# Patient Record
Sex: FEMALE | Race: ASIAN | NOT HISPANIC OR LATINO | URBAN - METROPOLITAN AREA
[De-identification: names, ages, dates, MRNs, and addresses within clinical notes are randomized per-mention and may not be internally consistent; named-entity substitution may affect disease eponyms.]

---

## 2017-04-01 ENCOUNTER — INPATIENT (INPATIENT)
Facility: HOSPITAL | Age: 32
LOS: 1 days | Discharge: HOME HEALTH | End: 2017-04-03
Attending: OBSTETRICS & GYNECOLOGY | Admitting: OBSTETRICS & GYNECOLOGY
Payer: MEDICAID

## 2017-04-01 VITALS — WEIGHT: 158.73 LBS | HEIGHT: 60 IN

## 2017-04-01 DIAGNOSIS — O26.899 OTHER SPECIFIED PREGNANCY RELATED CONDITIONS, UNSPECIFIED TRIMESTER: ICD-10-CM

## 2017-04-01 LAB
ALBUMIN SERPL ELPH-MCNC: 3.6 G/DL — SIGNIFICANT CHANGE UP (ref 3.3–5)
ALP SERPL-CCNC: 93 U/L — SIGNIFICANT CHANGE UP (ref 40–120)
ALT FLD-CCNC: 20 U/L — SIGNIFICANT CHANGE UP (ref 4–33)
APPEARANCE UR: CLEAR — SIGNIFICANT CHANGE UP
APTT BLD: 28.4 SEC — SIGNIFICANT CHANGE UP (ref 27.5–37.4)
AST SERPL-CCNC: 19 U/L — SIGNIFICANT CHANGE UP (ref 4–32)
BACTERIA # UR AUTO: HIGH
BASOPHILS # BLD AUTO: 0.02 K/UL — SIGNIFICANT CHANGE UP (ref 0–0.2)
BASOPHILS NFR BLD AUTO: 0.1 % — SIGNIFICANT CHANGE UP (ref 0–2)
BILIRUB SERPL-MCNC: 0.3 MG/DL — SIGNIFICANT CHANGE UP (ref 0.2–1.2)
BILIRUB UR-MCNC: NEGATIVE — SIGNIFICANT CHANGE UP
BLD GP AB SCN SERPL QL: NEGATIVE — SIGNIFICANT CHANGE UP
BLOOD UR QL VISUAL: HIGH
BUN SERPL-MCNC: 11 MG/DL — SIGNIFICANT CHANGE UP (ref 7–23)
CALCIUM SERPL-MCNC: 9.7 MG/DL — SIGNIFICANT CHANGE UP (ref 8.4–10.5)
CHLORIDE SERPL-SCNC: 102 MMOL/L — SIGNIFICANT CHANGE UP (ref 98–107)
CO2 SERPL-SCNC: 21 MMOL/L — LOW (ref 22–31)
COLOR SPEC: SIGNIFICANT CHANGE UP
CREAT ?TM UR-MCNC: 31.28 MG/DL — SIGNIFICANT CHANGE UP
CREAT ?TM UR-MCNC: 31.61 MG/DL — SIGNIFICANT CHANGE UP
CREAT SERPL-MCNC: 0.54 MG/DL — SIGNIFICANT CHANGE UP (ref 0.5–1.3)
EOSINOPHIL # BLD AUTO: 0.1 K/UL — SIGNIFICANT CHANGE UP (ref 0–0.5)
EOSINOPHIL NFR BLD AUTO: 0.6 % — SIGNIFICANT CHANGE UP (ref 0–6)
FIBRINOGEN PPP-MCNC: 869 MG/DL — HIGH (ref 310–510)
GLUCOSE SERPL-MCNC: 96 MG/DL — SIGNIFICANT CHANGE UP (ref 70–99)
GLUCOSE UR-MCNC: NEGATIVE — SIGNIFICANT CHANGE UP
HBV SURFACE AG SER-ACNC: NEGATIVE — SIGNIFICANT CHANGE UP
HCT VFR BLD CALC: 37.4 % — SIGNIFICANT CHANGE UP (ref 34.5–45)
HGB BLD-MCNC: 13 G/DL — SIGNIFICANT CHANGE UP (ref 11.5–15.5)
IMM GRANULOCYTES NFR BLD AUTO: 0.9 % — SIGNIFICANT CHANGE UP (ref 0–1.5)
INR BLD: 0.9 — SIGNIFICANT CHANGE UP (ref 0.88–1.17)
KETONES UR-MCNC: NEGATIVE — SIGNIFICANT CHANGE UP
LDH SERPL L TO P-CCNC: 169 U/L — SIGNIFICANT CHANGE UP (ref 135–225)
LEUKOCYTE ESTERASE UR-ACNC: NEGATIVE — SIGNIFICANT CHANGE UP
LYMPHOCYTES # BLD AUTO: 16.2 % — SIGNIFICANT CHANGE UP (ref 13–44)
LYMPHOCYTES # BLD AUTO: 2.64 K/UL — SIGNIFICANT CHANGE UP (ref 1–3.3)
MCHC RBC-ENTMCNC: 29.3 PG — SIGNIFICANT CHANGE UP (ref 27–34)
MCHC RBC-ENTMCNC: 34.8 % — SIGNIFICANT CHANGE UP (ref 32–36)
MCV RBC AUTO: 84.4 FL — SIGNIFICANT CHANGE UP (ref 80–100)
MONOCYTES # BLD AUTO: 0.88 K/UL — SIGNIFICANT CHANGE UP (ref 0–0.9)
MONOCYTES NFR BLD AUTO: 5.4 % — SIGNIFICANT CHANGE UP (ref 2–14)
MUCOUS THREADS # UR AUTO: SIGNIFICANT CHANGE UP
NEUTROPHILS # BLD AUTO: 12.55 K/UL — HIGH (ref 1.8–7.4)
NEUTROPHILS NFR BLD AUTO: 76.8 % — SIGNIFICANT CHANGE UP (ref 43–77)
NITRITE UR-MCNC: NEGATIVE — SIGNIFICANT CHANGE UP
NON-SQ EPI CELLS # UR AUTO: <1 — SIGNIFICANT CHANGE UP
PH UR: 7 — SIGNIFICANT CHANGE UP (ref 4.6–8)
PLATELET # BLD AUTO: 289 K/UL — SIGNIFICANT CHANGE UP (ref 150–400)
PMV BLD: 9.6 FL — SIGNIFICANT CHANGE UP (ref 7–13)
POTASSIUM SERPL-MCNC: 4.1 MMOL/L — SIGNIFICANT CHANGE UP (ref 3.5–5.3)
POTASSIUM SERPL-SCNC: 4.1 MMOL/L — SIGNIFICANT CHANGE UP (ref 3.5–5.3)
PROT SERPL-MCNC: 7.2 G/DL — SIGNIFICANT CHANGE UP (ref 6–8.3)
PROT UR-MCNC: 27.1 MG/DL — SIGNIFICANT CHANGE UP
PROT UR-MCNC: 30 — HIGH
PROT UR-MCNC: 32.7 MG/DL — SIGNIFICANT CHANGE UP
PROTHROM AB SERPL-ACNC: 10.1 SEC — SIGNIFICANT CHANGE UP (ref 9.8–13.1)
RBC # BLD: 4.43 M/UL — SIGNIFICANT CHANGE UP (ref 3.8–5.2)
RBC # FLD: 14.8 % — HIGH (ref 10.3–14.5)
RBC CASTS # UR COMP ASSIST: HIGH (ref 0–?)
RH IG SCN BLD-IMP: POSITIVE — SIGNIFICANT CHANGE UP
RH IG SCN BLD-IMP: POSITIVE — SIGNIFICANT CHANGE UP
SODIUM SERPL-SCNC: 138 MMOL/L — SIGNIFICANT CHANGE UP (ref 135–145)
SP GR SPEC: 1.01 — SIGNIFICANT CHANGE UP (ref 1–1.03)
SQUAMOUS # UR AUTO: SIGNIFICANT CHANGE UP
URATE SERPL-MCNC: 4.4 MG/DL — SIGNIFICANT CHANGE UP (ref 2.5–7)
UROBILINOGEN FLD QL: NORMAL E.U. — SIGNIFICANT CHANGE UP (ref 0.1–0.2)
WBC # BLD: 16.34 K/UL — HIGH (ref 3.8–10.5)
WBC # FLD AUTO: 16.34 K/UL — HIGH (ref 3.8–10.5)
WBC CLUMPS #/AREA URNS HPF: PRESENT — HIGH (ref 0–?)
WBC UR QL: HIGH (ref 0–?)

## 2017-04-01 PROCEDURE — 59409 OBSTETRICAL CARE: CPT | Mod: U8,UB

## 2017-04-01 RX ORDER — SODIUM CHLORIDE 9 MG/ML
1000 INJECTION, SOLUTION INTRAVENOUS
Qty: 0 | Refills: 0 | Status: DISCONTINUED | OUTPATIENT
Start: 2017-04-01 | End: 2017-04-01

## 2017-04-01 RX ORDER — SODIUM CHLORIDE 9 MG/ML
1000 INJECTION, SOLUTION INTRAVENOUS ONCE
Qty: 0 | Refills: 0 | Status: COMPLETED | OUTPATIENT
Start: 2017-04-01 | End: 2017-04-01

## 2017-04-01 RX ORDER — MAGNESIUM HYDROXIDE 400 MG/1
30 TABLET, CHEWABLE ORAL
Qty: 0 | Refills: 0 | Status: DISCONTINUED | OUTPATIENT
Start: 2017-04-01 | End: 2017-04-03

## 2017-04-01 RX ORDER — CITRIC ACID/SODIUM CITRATE 300-500 MG
15 SOLUTION, ORAL ORAL EVERY 4 HOURS
Qty: 0 | Refills: 0 | Status: DISCONTINUED | OUTPATIENT
Start: 2017-04-01 | End: 2017-04-01

## 2017-04-01 RX ORDER — DOCUSATE SODIUM 100 MG
100 CAPSULE ORAL
Qty: 0 | Refills: 0 | Status: DISCONTINUED | OUTPATIENT
Start: 2017-04-01 | End: 2017-04-03

## 2017-04-01 RX ORDER — LANOLIN
1 OINTMENT (GRAM) TOPICAL EVERY 6 HOURS
Qty: 0 | Refills: 0 | Status: DISCONTINUED | OUTPATIENT
Start: 2017-04-01 | End: 2017-04-03

## 2017-04-01 RX ORDER — IBUPROFEN 200 MG
600 TABLET ORAL EVERY 6 HOURS
Qty: 0 | Refills: 0 | Status: DISCONTINUED | OUTPATIENT
Start: 2017-04-01 | End: 2017-04-03

## 2017-04-01 RX ORDER — TETANUS TOXOID, REDUCED DIPHTHERIA TOXOID AND ACELLULAR PERTUSSIS VACCINE, ADSORBED 5; 2.5; 8; 8; 2.5 [IU]/.5ML; [IU]/.5ML; UG/.5ML; UG/.5ML; UG/.5ML
0.5 SUSPENSION INTRAMUSCULAR ONCE
Qty: 0 | Refills: 0 | Status: DISCONTINUED | OUTPATIENT
Start: 2017-04-01 | End: 2017-04-03

## 2017-04-01 RX ORDER — OXYTOCIN 10 UNIT/ML
41.67 VIAL (ML) INJECTION
Qty: 20 | Refills: 0 | Status: DISCONTINUED | OUTPATIENT
Start: 2017-04-01 | End: 2017-04-02

## 2017-04-01 RX ORDER — HYDROCORTISONE 1 %
1 OINTMENT (GRAM) TOPICAL EVERY 4 HOURS
Qty: 0 | Refills: 0 | Status: DISCONTINUED | OUTPATIENT
Start: 2017-04-01 | End: 2017-04-02

## 2017-04-01 RX ORDER — PRAMOXINE HYDROCHLORIDE 150 MG/15G
1 AEROSOL, FOAM RECTAL EVERY 4 HOURS
Qty: 0 | Refills: 0 | Status: DISCONTINUED | OUTPATIENT
Start: 2017-04-01 | End: 2017-04-02

## 2017-04-01 RX ORDER — SODIUM CHLORIDE 9 MG/ML
3 INJECTION INTRAMUSCULAR; INTRAVENOUS; SUBCUTANEOUS EVERY 8 HOURS
Qty: 0 | Refills: 0 | Status: DISCONTINUED | OUTPATIENT
Start: 2017-04-01 | End: 2017-04-01

## 2017-04-01 RX ORDER — MORPHINE SULFATE 50 MG/1
4 CAPSULE, EXTENDED RELEASE ORAL ONCE
Qty: 0 | Refills: 0 | Status: DISCONTINUED | OUTPATIENT
Start: 2017-04-01 | End: 2017-04-01

## 2017-04-01 RX ORDER — SIMETHICONE 80 MG/1
80 TABLET, CHEWABLE ORAL EVERY 6 HOURS
Qty: 0 | Refills: 0 | Status: DISCONTINUED | OUTPATIENT
Start: 2017-04-01 | End: 2017-04-03

## 2017-04-01 RX ORDER — AER TRAVELER 0.5 G/1
1 SOLUTION RECTAL; TOPICAL EVERY 4 HOURS
Qty: 0 | Refills: 0 | Status: DISCONTINUED | OUTPATIENT
Start: 2017-04-01 | End: 2017-04-01

## 2017-04-01 RX ORDER — OXYCODONE HYDROCHLORIDE 5 MG/1
5 TABLET ORAL EVERY 4 HOURS
Qty: 0 | Refills: 0 | Status: DISCONTINUED | OUTPATIENT
Start: 2017-04-01 | End: 2017-04-03

## 2017-04-01 RX ORDER — KETOROLAC TROMETHAMINE 30 MG/ML
30 SYRINGE (ML) INJECTION ONCE
Qty: 0 | Refills: 0 | Status: DISCONTINUED | OUTPATIENT
Start: 2017-04-01 | End: 2017-04-01

## 2017-04-01 RX ORDER — OXYTOCIN 10 UNIT/ML
333.3 VIAL (ML) INJECTION
Qty: 20 | Refills: 0 | Status: COMPLETED | OUTPATIENT
Start: 2017-04-01 | End: 2017-04-01

## 2017-04-01 RX ORDER — DIPHENHYDRAMINE HCL 50 MG
25 CAPSULE ORAL EVERY 6 HOURS
Qty: 0 | Refills: 0 | Status: DISCONTINUED | OUTPATIENT
Start: 2017-04-01 | End: 2017-04-03

## 2017-04-01 RX ORDER — SODIUM CHLORIDE 9 MG/ML
3 INJECTION INTRAMUSCULAR; INTRAVENOUS; SUBCUTANEOUS EVERY 8 HOURS
Qty: 0 | Refills: 0 | Status: DISCONTINUED | OUTPATIENT
Start: 2017-04-01 | End: 2017-04-03

## 2017-04-01 RX ORDER — AER TRAVELER 0.5 G/1
1 SOLUTION RECTAL; TOPICAL EVERY 4 HOURS
Qty: 0 | Refills: 0 | Status: DISCONTINUED | OUTPATIENT
Start: 2017-04-01 | End: 2017-04-03

## 2017-04-01 RX ORDER — GLYCERIN ADULT
1 SUPPOSITORY, RECTAL RECTAL AT BEDTIME
Qty: 0 | Refills: 0 | Status: DISCONTINUED | OUTPATIENT
Start: 2017-04-01 | End: 2017-04-03

## 2017-04-01 RX ORDER — OXYTOCIN 10 UNIT/ML
41.67 VIAL (ML) INJECTION
Qty: 20 | Refills: 0 | Status: DISCONTINUED | OUTPATIENT
Start: 2017-04-01 | End: 2017-04-01

## 2017-04-01 RX ORDER — DIBUCAINE 1 %
1 OINTMENT (GRAM) RECTAL EVERY 4 HOURS
Qty: 0 | Refills: 0 | Status: DISCONTINUED | OUTPATIENT
Start: 2017-04-01 | End: 2017-04-01

## 2017-04-01 RX ORDER — ACETAMINOPHEN 500 MG
975 TABLET ORAL EVERY 6 HOURS
Qty: 0 | Refills: 0 | Status: DISCONTINUED | OUTPATIENT
Start: 2017-04-01 | End: 2017-04-03

## 2017-04-01 RX ORDER — OXYTOCIN 10 UNIT/ML
333.3 VIAL (ML) INJECTION
Qty: 20 | Refills: 0 | Status: COMPLETED | OUTPATIENT
Start: 2017-04-01

## 2017-04-01 RX ORDER — DIBUCAINE 1 %
1 OINTMENT (GRAM) RECTAL EVERY 4 HOURS
Qty: 0 | Refills: 0 | Status: DISCONTINUED | OUTPATIENT
Start: 2017-04-01 | End: 2017-04-03

## 2017-04-01 RX ORDER — HYDROCORTISONE 1 %
1 OINTMENT (GRAM) TOPICAL EVERY 4 HOURS
Qty: 0 | Refills: 0 | Status: DISCONTINUED | OUTPATIENT
Start: 2017-04-01 | End: 2017-04-01

## 2017-04-01 RX ORDER — OXYCODONE HYDROCHLORIDE 5 MG/1
5 TABLET ORAL
Qty: 0 | Refills: 0 | Status: DISCONTINUED | OUTPATIENT
Start: 2017-04-01 | End: 2017-04-03

## 2017-04-01 RX ORDER — PRAMOXINE HYDROCHLORIDE 150 MG/15G
1 AEROSOL, FOAM RECTAL EVERY 4 HOURS
Qty: 0 | Refills: 0 | Status: DISCONTINUED | OUTPATIENT
Start: 2017-04-01 | End: 2017-04-01

## 2017-04-01 RX ADMIN — SODIUM CHLORIDE 3 MILLILITER(S): 9 INJECTION INTRAMUSCULAR; INTRAVENOUS; SUBCUTANEOUS at 22:17

## 2017-04-01 RX ADMIN — Medication 600 MILLIGRAM(S): at 23:02

## 2017-04-01 RX ADMIN — Medication 0.25 MILLIGRAM(S): at 05:32

## 2017-04-01 RX ADMIN — Medication 975 MILLIGRAM(S): at 22:16

## 2017-04-01 RX ADMIN — SODIUM CHLORIDE 125 MILLILITER(S): 9 INJECTION, SOLUTION INTRAVENOUS at 04:26

## 2017-04-01 RX ADMIN — Medication 975 MILLIGRAM(S): at 23:10

## 2017-04-01 RX ADMIN — Medication 999.9 MILLIUNIT(S)/MIN: at 11:04

## 2017-04-01 RX ADMIN — Medication 125 MILLIUNIT(S)/MIN: at 11:38

## 2017-04-01 RX ADMIN — Medication 30 MILLIGRAM(S): at 12:45

## 2017-04-01 RX ADMIN — Medication 1 TABLET(S): at 22:15

## 2017-04-01 RX ADMIN — Medication 975 MILLIGRAM(S): at 16:15

## 2017-04-01 RX ADMIN — Medication 975 MILLIGRAM(S): at 15:30

## 2017-04-01 RX ADMIN — Medication 30 MILLIGRAM(S): at 13:30

## 2017-04-01 RX ADMIN — Medication 600 MILLIGRAM(S): at 22:16

## 2017-04-02 LAB
RUBV IGG SER-ACNC: 3.9 INDEX — SIGNIFICANT CHANGE UP
RUBV IGG SER-IMP: POSITIVE — SIGNIFICANT CHANGE UP
T PALLIDUM AB TITR SER: NEGATIVE — SIGNIFICANT CHANGE UP

## 2017-04-02 RX ORDER — HYDROCORTISONE 1 %
1 OINTMENT (GRAM) TOPICAL EVERY 4 HOURS
Qty: 0 | Refills: 0 | Status: DISCONTINUED | OUTPATIENT
Start: 2017-04-02 | End: 2017-04-03

## 2017-04-02 RX ORDER — PRAMOXINE HYDROCHLORIDE 150 MG/15G
1 AEROSOL, FOAM RECTAL EVERY 4 HOURS
Qty: 0 | Refills: 0 | Status: DISCONTINUED | OUTPATIENT
Start: 2017-04-02 | End: 2017-04-03

## 2017-04-02 RX ADMIN — Medication 975 MILLIGRAM(S): at 12:38

## 2017-04-02 RX ADMIN — Medication 975 MILLIGRAM(S): at 18:50

## 2017-04-02 RX ADMIN — Medication 1 APPLICATION(S): at 05:32

## 2017-04-02 RX ADMIN — SODIUM CHLORIDE 3 MILLILITER(S): 9 INJECTION INTRAMUSCULAR; INTRAVENOUS; SUBCUTANEOUS at 05:34

## 2017-04-02 RX ADMIN — Medication 975 MILLIGRAM(S): at 06:29

## 2017-04-02 RX ADMIN — Medication 600 MILLIGRAM(S): at 05:33

## 2017-04-02 RX ADMIN — Medication 600 MILLIGRAM(S): at 13:09

## 2017-04-02 RX ADMIN — Medication 600 MILLIGRAM(S): at 18:51

## 2017-04-02 RX ADMIN — Medication 600 MILLIGRAM(S): at 12:39

## 2017-04-02 RX ADMIN — Medication 1 TABLET(S): at 12:40

## 2017-04-02 RX ADMIN — Medication 600 MILLIGRAM(S): at 06:30

## 2017-04-02 RX ADMIN — PRAMOXINE HYDROCHLORIDE 1 APPLICATION(S): 150 AEROSOL, FOAM RECTAL at 05:32

## 2017-04-02 RX ADMIN — Medication 600 MILLIGRAM(S): at 19:20

## 2017-04-02 RX ADMIN — AER TRAVELER 1 APPLICATION(S): 0.5 SOLUTION RECTAL; TOPICAL at 05:33

## 2017-04-02 RX ADMIN — Medication 975 MILLIGRAM(S): at 13:08

## 2017-04-02 RX ADMIN — SODIUM CHLORIDE 3 MILLILITER(S): 9 INJECTION INTRAMUSCULAR; INTRAVENOUS; SUBCUTANEOUS at 14:00

## 2017-04-02 RX ADMIN — Medication 975 MILLIGRAM(S): at 05:34

## 2017-04-02 RX ADMIN — Medication 975 MILLIGRAM(S): at 19:20

## 2017-04-03 VITALS
DIASTOLIC BLOOD PRESSURE: 77 MMHG | RESPIRATION RATE: 18 BRPM | SYSTOLIC BLOOD PRESSURE: 130 MMHG | OXYGEN SATURATION: 99 % | HEART RATE: 88 BPM | TEMPERATURE: 99 F

## 2017-04-03 RX ORDER — IBUPROFEN 200 MG
1 TABLET ORAL
Qty: 0 | Refills: 0 | COMMUNITY
Start: 2017-04-03

## 2017-04-03 RX ORDER — NORETHINDRONE 0.35 MG/1
1 TABLET ORAL
Qty: 30 | Refills: 1 | OUTPATIENT
Start: 2017-04-03 | End: 2017-06-01

## 2017-04-03 RX ORDER — ACETAMINOPHEN 500 MG
3 TABLET ORAL
Qty: 0 | Refills: 0 | COMMUNITY
Start: 2017-04-03

## 2017-04-03 RX ADMIN — Medication 975 MILLIGRAM(S): at 00:25

## 2017-04-03 RX ADMIN — OXYCODONE HYDROCHLORIDE 5 MILLIGRAM(S): 5 TABLET ORAL at 10:39

## 2017-04-03 RX ADMIN — Medication 600 MILLIGRAM(S): at 12:56

## 2017-04-03 RX ADMIN — Medication 600 MILLIGRAM(S): at 07:00

## 2017-04-03 RX ADMIN — Medication 975 MILLIGRAM(S): at 11:30

## 2017-04-03 RX ADMIN — Medication 600 MILLIGRAM(S): at 13:55

## 2017-04-03 RX ADMIN — Medication 975 MILLIGRAM(S): at 01:17

## 2017-04-03 RX ADMIN — OXYCODONE HYDROCHLORIDE 5 MILLIGRAM(S): 5 TABLET ORAL at 11:30

## 2017-04-03 RX ADMIN — Medication 600 MILLIGRAM(S): at 00:26

## 2017-04-03 RX ADMIN — Medication 1 TABLET(S): at 10:39

## 2017-04-03 RX ADMIN — Medication 975 MILLIGRAM(S): at 10:39

## 2017-04-03 RX ADMIN — Medication 600 MILLIGRAM(S): at 01:17

## 2017-04-03 RX ADMIN — Medication 600 MILLIGRAM(S): at 06:18

## 2017-04-03 NOTE — DISCHARGE NOTE OB - ADDITIONAL INSTRUCTIONS
please call clinic to set up blood pressure check in 1 wk please call clinic to set up blood pressure check in 1 wk  Pt Instruct  to call for Postpartum follow up at [facility]. OB Clinic Phone # Given from Flyer

## 2017-04-03 NOTE — DISCHARGE NOTE OB - VISION (WITH CORRECTIVE LENSES IF THE PATIENT USUALLY WEARS THEM):
What is a hydrocele? -- A hydrocele is a buildup of fluid inside the scrotum. The scrotum is the skin sac that holds the testicles (figure 1).  Hydroceles are common in  baby boys. They usually go away by the time the baby is 1 year old. Older boys and adult men can also get hydroceles.  What are the symptoms of a hydrocele? -- A hydrocele usually does not cause symptoms, except when it gets very large. When it does, the symptoms can include:  ?Pain or discomfort in the scrotum  ?Feeling as though the scrotum is heavy or full  ?Swelling or irritation in the skin around the scrotum  Is there a test for a hydrocele? -- Yes. Tests include:  ?Light test - Your doctor can shine a powerful light on the area of your scrotum where there is a swelling. If the light passes through, it means nothing solid is blocking the light. The fluid in a hydrocele does not block the light, so if the light goes through that, it is good proof that the swelling is a hydrocele.  ?Ultrasound - This test uses sound waves to create pictures of the inside of the body. An ultrasound can tell the doctor if you have a hydrocele or a different condition.  How is a hydrocele treated? -- Treatment depends on what caused the hydrocele and what symptoms it causes. Treatment is not always necessary. Some hydroceles go away on their own. Depending on your age, symptoms, and type of hydrocele, you might not need treatment.  If a baby has a hydrocele that does not go away by age 1, he will probably need surgery to remove the fluid or the sac that holds it.           Normal vision: sees adequately in most situations; can see medication labels, newsprint

## 2017-04-03 NOTE — DISCHARGE NOTE OB - CARE PLAN
Principal Discharge DX:	Vaginal delivery  Goal:	recovery  Instructions for follow-up, activity and diet:	After discharge, please stay on pelvic rest for 6 weeks, meaning no sexual intercourse, no tampons and no douching.  No driving for 2 weeks as women can loose a lot of blood during delivery and there is a possibility of being lightheaded/fainting.  No lifting objects heavier than baby for two weeks.  Expect to have vaginal bleeding/spotting for up to six weeks.  The bleeding should get lighter and more white/light brown with time.  For bleeding soaking more than a pad an hour or passing clots greater than the size of your fist, come in to the emergency department.    Follow up in clinic in 1 wk for BP check

## 2017-04-03 NOTE — DISCHARGE NOTE OB - MATERIALS PROVIDED
Birth Certificate Instructions/[facility]  Screening Program/  Immunization Record/[facility] Hearing Screen Program/Shaken Baby Prevention Handout/Vaccinations/Guide to Postpartum Care

## 2017-04-03 NOTE — DISCHARGE NOTE OB - HOME CARE AGENCY
[facility]  [phone] .   Nurse to call and visit day after discharge

## 2017-04-03 NOTE — DISCHARGE NOTE OB - MEDICATION SUMMARY - MEDICATIONS TO TAKE
I will START or STAY ON the medications listed below when I get home from the hospital:    acetaminophen 325 mg oral tablet  -- 3 tab(s) by mouth every 6 hours  -- Indication: For pain    ibuprofen 600 mg oral tablet  -- 1 tab(s) by mouth every 6 hours  -- Indication: For pain    norethindrone 0.35 mg oral tablet  -- 1 tab(s) by mouth once a day  -- Do not take this drug if you are pregnant.  It is very important that you take or use this exactly as directed.  Do not skip doses or discontinue unless directed by your doctor.    -- Indication: For birth control

## 2017-04-03 NOTE — DISCHARGE NOTE OB - PATIENT PORTAL LINK FT
“You can access the FollowHealth Patient Portal, offered by [facility], by registering with the following website: [email or web address]”

## 2017-04-03 NOTE — DISCHARGE NOTE OB - PLAN OF CARE
recovery After discharge, please stay on pelvic rest for 6 weeks, meaning no sexual intercourse, no tampons and no douching.  No driving for 2 weeks as women can loose a lot of blood during delivery and there is a possibility of being lightheaded/fainting.  No lifting objects heavier than baby for two weeks.  Expect to have vaginal bleeding/spotting for up to six weeks.  The bleeding should get lighter and more white/light brown with time.  For bleeding soaking more than a pad an hour or passing clots greater than the size of your fist, come in to the emergency department.    Follow up in clinic in 1 wk for BP check

## 2017-04-03 NOTE — DISCHARGE NOTE OB - CARE PROVIDER_API CALL
clinic,   [facility], Oncology building, Ambulatory Care unit, 3rd fl  Phone: [phone]  Fax: (   )    -

## 2017-04-03 NOTE — DISCHARGE NOTE OB - PROVIDER TOKENS
FREE:[LAST:[clinic],PHONE:[phone]],FAX:[(   )    -],ADDRESS:[[facility], Oncology building, Ambulatory Care unit, [facility]]]

## 2020-06-25 ENCOUNTER — OUTPATIENT (OUTPATIENT)
Dept: OUTPATIENT SERVICES | Facility: HOSPITAL | Age: 35
LOS: 1 days | End: 2020-06-25
Payer: MEDICAID

## 2020-06-25 DIAGNOSIS — O26.899 OTHER SPECIFIED PREGNANCY RELATED CONDITIONS, UNSPECIFIED TRIMESTER: ICD-10-CM

## 2020-06-25 DIAGNOSIS — Z3A.00 WEEKS OF GESTATION OF PREGNANCY NOT SPECIFIED: ICD-10-CM

## 2020-06-25 PROCEDURE — G0463: CPT

## 2020-06-25 PROCEDURE — 59025 FETAL NON-STRESS TEST: CPT

## 2020-06-30 ENCOUNTER — RESULT REVIEW (OUTPATIENT)
Age: 35
End: 2020-06-30

## 2020-06-30 ENCOUNTER — INPATIENT (INPATIENT)
Facility: HOSPITAL | Age: 35
LOS: 1 days | Discharge: HOME | End: 2020-07-02
Attending: OBSTETRICS & GYNECOLOGY | Admitting: OBSTETRICS & GYNECOLOGY
Payer: MEDICAID

## 2020-06-30 VITALS — HEIGHT: 61 IN | WEIGHT: 149.91 LBS

## 2020-06-30 DIAGNOSIS — Z3A.00 WEEKS OF GESTATION OF PREGNANCY NOT SPECIFIED: ICD-10-CM

## 2020-06-30 DIAGNOSIS — O26.899 OTHER SPECIFIED PREGNANCY RELATED CONDITIONS, UNSPECIFIED TRIMESTER: ICD-10-CM

## 2020-06-30 DIAGNOSIS — Z34.80 ENCOUNTER FOR SUPERVISION OF OTHER NORMAL PREGNANCY, UNSPECIFIED TRIMESTER: ICD-10-CM

## 2020-06-30 LAB
ABO RH CONFIRMATION: SIGNIFICANT CHANGE UP
ALBUMIN SERPL ELPH-MCNC: 2.6 G/DL — LOW (ref 3.5–5)
ALP SERPL-CCNC: 125 U/L — HIGH (ref 40–120)
ALT FLD-CCNC: 32 U/L DA — SIGNIFICANT CHANGE UP (ref 10–60)
ANION GAP SERPL CALC-SCNC: 10 MMOL/L — SIGNIFICANT CHANGE UP (ref 5–17)
APPEARANCE UR: CLEAR — SIGNIFICANT CHANGE UP
APTT BLD: 29.6 SEC — SIGNIFICANT CHANGE UP (ref 27.5–35.5)
AST SERPL-CCNC: 26 U/L — SIGNIFICANT CHANGE UP (ref 10–40)
BACTERIA # UR AUTO: ABNORMAL /HPF
BASOPHILS # BLD AUTO: 0.04 K/UL — SIGNIFICANT CHANGE UP (ref 0–0.2)
BASOPHILS NFR BLD AUTO: 0.3 % — SIGNIFICANT CHANGE UP (ref 0–2)
BILIRUB SERPL-MCNC: 0.4 MG/DL — SIGNIFICANT CHANGE UP (ref 0.2–1.2)
BILIRUB UR-MCNC: NEGATIVE — SIGNIFICANT CHANGE UP
BLD GP AB SCN SERPL QL: SIGNIFICANT CHANGE UP
BUN SERPL-MCNC: 10 MG/DL — SIGNIFICANT CHANGE UP (ref 7–18)
CALCIUM SERPL-MCNC: 9.2 MG/DL — SIGNIFICANT CHANGE UP (ref 8.4–10.5)
CHLORIDE SERPL-SCNC: 106 MMOL/L — SIGNIFICANT CHANGE UP (ref 96–108)
CO2 SERPL-SCNC: 22 MMOL/L — SIGNIFICANT CHANGE UP (ref 22–31)
COLOR SPEC: YELLOW — SIGNIFICANT CHANGE UP
COMMENT - URINE: SIGNIFICANT CHANGE UP
CREAT ?TM UR-MCNC: 80 MG/DL — SIGNIFICANT CHANGE UP
CREAT SERPL-MCNC: 0.45 MG/DL — LOW (ref 0.5–1.3)
DIFF PNL FLD: NEGATIVE — SIGNIFICANT CHANGE UP
EOSINOPHIL # BLD AUTO: 0.12 K/UL — SIGNIFICANT CHANGE UP (ref 0–0.5)
EOSINOPHIL NFR BLD AUTO: 1 % — SIGNIFICANT CHANGE UP (ref 0–6)
EPI CELLS # UR: ABNORMAL /HPF
FIBRINOGEN PPP-MCNC: 1028 MG/DL — HIGH (ref 350–510)
GLUCOSE BLDC GLUCOMTR-MCNC: 101 MG/DL — HIGH (ref 70–99)
GLUCOSE BLDC GLUCOMTR-MCNC: 105 MG/DL — HIGH (ref 70–99)
GLUCOSE BLDC GLUCOMTR-MCNC: 141 MG/DL — HIGH (ref 70–99)
GLUCOSE BLDC GLUCOMTR-MCNC: 83 MG/DL — SIGNIFICANT CHANGE UP (ref 70–99)
GLUCOSE SERPL-MCNC: 68 MG/DL — LOW (ref 70–99)
GLUCOSE UR QL: NEGATIVE — SIGNIFICANT CHANGE UP
HCT VFR BLD CALC: 39.4 % — SIGNIFICANT CHANGE UP (ref 34.5–45)
HGB BLD-MCNC: 13.6 G/DL — SIGNIFICANT CHANGE UP (ref 11.5–15.5)
IMM GRANULOCYTES NFR BLD AUTO: 0.6 % — SIGNIFICANT CHANGE UP (ref 0–1.5)
INR BLD: 0.96 RATIO — SIGNIFICANT CHANGE UP (ref 0.88–1.16)
KETONES UR-MCNC: ABNORMAL
LDH SERPL L TO P-CCNC: 186 U/L — SIGNIFICANT CHANGE UP (ref 120–225)
LEUKOCYTE ESTERASE UR-ACNC: ABNORMAL
LYMPHOCYTES # BLD AUTO: 2.66 K/UL — SIGNIFICANT CHANGE UP (ref 1–3.3)
LYMPHOCYTES # BLD AUTO: 22.8 % — SIGNIFICANT CHANGE UP (ref 13–44)
MCHC RBC-ENTMCNC: 29 PG — SIGNIFICANT CHANGE UP (ref 27–34)
MCHC RBC-ENTMCNC: 34.5 GM/DL — SIGNIFICANT CHANGE UP (ref 32–36)
MCV RBC AUTO: 84 FL — SIGNIFICANT CHANGE UP (ref 80–100)
MONOCYTES # BLD AUTO: 0.73 K/UL — SIGNIFICANT CHANGE UP (ref 0–0.9)
MONOCYTES NFR BLD AUTO: 6.3 % — SIGNIFICANT CHANGE UP (ref 2–14)
NEUTROPHILS # BLD AUTO: 8.03 K/UL — HIGH (ref 1.8–7.4)
NEUTROPHILS NFR BLD AUTO: 69 % — SIGNIFICANT CHANGE UP (ref 43–77)
NITRITE UR-MCNC: NEGATIVE — SIGNIFICANT CHANGE UP
NRBC # BLD: 0 /100 WBCS — SIGNIFICANT CHANGE UP (ref 0–0)
PH UR: 6 — SIGNIFICANT CHANGE UP (ref 5–8)
PLATELET # BLD AUTO: 243 K/UL — SIGNIFICANT CHANGE UP (ref 150–400)
POTASSIUM SERPL-MCNC: 3.8 MMOL/L — SIGNIFICANT CHANGE UP (ref 3.5–5.3)
POTASSIUM SERPL-SCNC: 3.8 MMOL/L — SIGNIFICANT CHANGE UP (ref 3.5–5.3)
PROT ?TM UR-MCNC: 42 MG/DL — HIGH (ref 0–12)
PROT SERPL-MCNC: 7.4 G/DL — SIGNIFICANT CHANGE UP (ref 6–8.3)
PROT UR-MCNC: 30 MG/DL
PROTHROM AB SERPL-ACNC: 11.3 SEC — SIGNIFICANT CHANGE UP (ref 10.6–13.6)
RBC # BLD: 4.69 M/UL — SIGNIFICANT CHANGE UP (ref 3.8–5.2)
RBC # FLD: 13 % — SIGNIFICANT CHANGE UP (ref 10.3–14.5)
RBC CASTS # UR COMP ASSIST: SIGNIFICANT CHANGE UP /HPF (ref 0–2)
SARS-COV-2 IGG SERPL QL IA: NEGATIVE — SIGNIFICANT CHANGE UP
SARS-COV-2 IGM SERPL IA-ACNC: 0.01 INDEX — SIGNIFICANT CHANGE UP
SARS-COV-2 RNA SPEC QL NAA+PROBE: SIGNIFICANT CHANGE UP
SODIUM SERPL-SCNC: 138 MMOL/L — SIGNIFICANT CHANGE UP (ref 135–145)
SP GR SPEC: 1.01 — SIGNIFICANT CHANGE UP (ref 1.01–1.02)
T PALLIDUM AB TITR SER: NEGATIVE — SIGNIFICANT CHANGE UP
URATE SERPL-MCNC: 4.2 MG/DL — SIGNIFICANT CHANGE UP (ref 2.5–7)
UROBILINOGEN FLD QL: NEGATIVE — SIGNIFICANT CHANGE UP
WBC # BLD: 11.65 K/UL — HIGH (ref 3.8–10.5)
WBC # FLD AUTO: 11.65 K/UL — HIGH (ref 3.8–10.5)
WBC UR QL: SIGNIFICANT CHANGE UP /HPF (ref 0–5)

## 2020-06-30 RX ORDER — OXYTOCIN 10 UNIT/ML
2 VIAL (ML) INJECTION
Qty: 30 | Refills: 0 | Status: DISCONTINUED | OUTPATIENT
Start: 2020-06-30 | End: 2020-06-30

## 2020-06-30 RX ORDER — SODIUM CHLORIDE 9 MG/ML
1000 INJECTION INTRAMUSCULAR; INTRAVENOUS; SUBCUTANEOUS
Refills: 0 | Status: DISCONTINUED | OUTPATIENT
Start: 2020-06-30 | End: 2020-07-01

## 2020-06-30 RX ORDER — OXYTOCIN 10 UNIT/ML
2 VIAL (ML) INJECTION
Qty: 30 | Refills: 0 | Status: DISCONTINUED | OUTPATIENT
Start: 2020-06-30 | End: 2020-07-01

## 2020-06-30 RX ORDER — CITRIC ACID/SODIUM CITRATE 300-500 MG
15 SOLUTION, ORAL ORAL EVERY 6 HOURS
Refills: 0 | Status: DISCONTINUED | OUTPATIENT
Start: 2020-06-30 | End: 2020-07-01

## 2020-06-30 RX ORDER — SODIUM CHLORIDE 9 MG/ML
1000 INJECTION, SOLUTION INTRAVENOUS
Refills: 0 | Status: DISCONTINUED | OUTPATIENT
Start: 2020-06-30 | End: 2020-07-01

## 2020-06-30 RX ADMIN — SODIUM CHLORIDE 125 MILLILITER(S): 9 INJECTION, SOLUTION INTRAVENOUS at 12:19

## 2020-06-30 RX ADMIN — Medication 2 MILLIUNIT(S)/MIN: at 12:14

## 2020-06-30 NOTE — PATIENT PROFILE OB - ABORTIONS, OB PROFILE
Patient was seen on 10/4/18 and Dr. Aurora Brenner prescribed Zanaflex. Patients nurse went to the pharmacy to pick it up and was told that they couldn't take this medication with Baclofen. Talked with Dr. Patric Perez recommend that medication should be taken 1-2 hours apart to prevent interaction. Nurse said she understood but needed it in writing to add to her notes so that the medication can be given.
0

## 2020-07-01 LAB
BASOPHILS # BLD AUTO: 0.05 K/UL — SIGNIFICANT CHANGE UP (ref 0–0.2)
BASOPHILS NFR BLD AUTO: 0.3 % — SIGNIFICANT CHANGE UP (ref 0–2)
EOSINOPHIL # BLD AUTO: 0.13 K/UL — SIGNIFICANT CHANGE UP (ref 0–0.5)
EOSINOPHIL NFR BLD AUTO: 0.8 % — SIGNIFICANT CHANGE UP (ref 0–6)
GLUCOSE BLDC GLUCOMTR-MCNC: 168 MG/DL — HIGH (ref 70–99)
HCT VFR BLD CALC: 38.1 % — SIGNIFICANT CHANGE UP (ref 34.5–45)
HGB BLD-MCNC: 13.3 G/DL — SIGNIFICANT CHANGE UP (ref 11.5–15.5)
IMM GRANULOCYTES NFR BLD AUTO: 0.6 % — SIGNIFICANT CHANGE UP (ref 0–1.5)
LYMPHOCYTES # BLD AUTO: 17.8 % — SIGNIFICANT CHANGE UP (ref 13–44)
LYMPHOCYTES # BLD AUTO: 3.07 K/UL — SIGNIFICANT CHANGE UP (ref 1–3.3)
MCHC RBC-ENTMCNC: 29.6 PG — SIGNIFICANT CHANGE UP (ref 27–34)
MCHC RBC-ENTMCNC: 34.9 GM/DL — SIGNIFICANT CHANGE UP (ref 32–36)
MCV RBC AUTO: 84.9 FL — SIGNIFICANT CHANGE UP (ref 80–100)
MONOCYTES # BLD AUTO: 1.09 K/UL — HIGH (ref 0–0.9)
MONOCYTES NFR BLD AUTO: 6.3 % — SIGNIFICANT CHANGE UP (ref 2–14)
NEUTROPHILS # BLD AUTO: 12.8 K/UL — HIGH (ref 1.8–7.4)
NEUTROPHILS NFR BLD AUTO: 74.2 % — SIGNIFICANT CHANGE UP (ref 43–77)
NRBC # BLD: 0 /100 WBCS — SIGNIFICANT CHANGE UP (ref 0–0)
PLATELET # BLD AUTO: 219 K/UL — SIGNIFICANT CHANGE UP (ref 150–400)
RBC # BLD: 4.49 M/UL — SIGNIFICANT CHANGE UP (ref 3.8–5.2)
RBC # FLD: 13.2 % — SIGNIFICANT CHANGE UP (ref 10.3–14.5)
WBC # BLD: 17.24 K/UL — HIGH (ref 3.8–10.5)
WBC # FLD AUTO: 17.24 K/UL — HIGH (ref 3.8–10.5)

## 2020-07-01 PROCEDURE — 88307 TISSUE EXAM BY PATHOLOGIST: CPT | Mod: 26

## 2020-07-01 RX ORDER — FERROUS SULFATE 325(65) MG
325 TABLET ORAL DAILY
Refills: 0 | Status: DISCONTINUED | OUTPATIENT
Start: 2020-07-01 | End: 2020-07-02

## 2020-07-01 RX ORDER — MAGNESIUM HYDROXIDE 400 MG/1
30 TABLET, CHEWABLE ORAL
Refills: 0 | Status: DISCONTINUED | OUTPATIENT
Start: 2020-07-01 | End: 2020-07-02

## 2020-07-01 RX ORDER — OXYCODONE HYDROCHLORIDE 5 MG/1
5 TABLET ORAL ONCE
Refills: 0 | Status: DISCONTINUED | OUTPATIENT
Start: 2020-07-01 | End: 2020-07-02

## 2020-07-01 RX ORDER — OXYCODONE HYDROCHLORIDE 5 MG/1
5 TABLET ORAL
Refills: 0 | Status: DISCONTINUED | OUTPATIENT
Start: 2020-07-01 | End: 2020-07-02

## 2020-07-01 RX ORDER — KETOROLAC TROMETHAMINE 30 MG/ML
30 SYRINGE (ML) INJECTION ONCE
Refills: 0 | Status: DISCONTINUED | OUTPATIENT
Start: 2020-07-01 | End: 2020-07-02

## 2020-07-01 RX ORDER — SODIUM CHLORIDE 9 MG/ML
3 INJECTION INTRAMUSCULAR; INTRAVENOUS; SUBCUTANEOUS EVERY 8 HOURS
Refills: 0 | Status: DISCONTINUED | OUTPATIENT
Start: 2020-07-01 | End: 2020-07-02

## 2020-07-01 RX ORDER — TETANUS TOXOID, REDUCED DIPHTHERIA TOXOID AND ACELLULAR PERTUSSIS VACCINE, ADSORBED 5; 2.5; 8; 8; 2.5 [IU]/.5ML; [IU]/.5ML; UG/.5ML; UG/.5ML; UG/.5ML
0.5 SUSPENSION INTRAMUSCULAR ONCE
Refills: 0 | Status: DISCONTINUED | OUTPATIENT
Start: 2020-07-01 | End: 2020-07-02

## 2020-07-01 RX ORDER — IBUPROFEN 200 MG
600 TABLET ORAL EVERY 6 HOURS
Refills: 0 | Status: DISCONTINUED | OUTPATIENT
Start: 2020-07-01 | End: 2020-07-02

## 2020-07-01 RX ORDER — AER TRAVELER 0.5 G/1
1 SOLUTION RECTAL; TOPICAL EVERY 4 HOURS
Refills: 0 | Status: DISCONTINUED | OUTPATIENT
Start: 2020-07-01 | End: 2020-07-02

## 2020-07-01 RX ORDER — PRAMOXINE HYDROCHLORIDE 150 MG/15G
1 AEROSOL, FOAM RECTAL EVERY 4 HOURS
Refills: 0 | Status: DISCONTINUED | OUTPATIENT
Start: 2020-07-01 | End: 2020-07-02

## 2020-07-01 RX ORDER — BENZOCAINE 10 %
1 GEL (GRAM) MUCOUS MEMBRANE EVERY 6 HOURS
Refills: 0 | Status: DISCONTINUED | OUTPATIENT
Start: 2020-07-01 | End: 2020-07-02

## 2020-07-01 RX ORDER — ACETAMINOPHEN 500 MG
975 TABLET ORAL
Refills: 0 | Status: DISCONTINUED | OUTPATIENT
Start: 2020-07-01 | End: 2020-07-02

## 2020-07-01 RX ORDER — DIBUCAINE 1 %
1 OINTMENT (GRAM) RECTAL EVERY 6 HOURS
Refills: 0 | Status: DISCONTINUED | OUTPATIENT
Start: 2020-07-01 | End: 2020-07-02

## 2020-07-01 RX ORDER — LANOLIN
1 OINTMENT (GRAM) TOPICAL EVERY 6 HOURS
Refills: 0 | Status: DISCONTINUED | OUTPATIENT
Start: 2020-07-01 | End: 2020-07-02

## 2020-07-01 RX ORDER — DIPHENHYDRAMINE HCL 50 MG
25 CAPSULE ORAL EVERY 6 HOURS
Refills: 0 | Status: DISCONTINUED | OUTPATIENT
Start: 2020-07-01 | End: 2020-07-02

## 2020-07-01 RX ORDER — OXYTOCIN 10 UNIT/ML
333.33 VIAL (ML) INJECTION
Qty: 20 | Refills: 0 | Status: DISCONTINUED | OUTPATIENT
Start: 2020-07-01 | End: 2020-07-02

## 2020-07-01 RX ORDER — HYDROCORTISONE 1 %
1 OINTMENT (GRAM) TOPICAL EVERY 6 HOURS
Refills: 0 | Status: DISCONTINUED | OUTPATIENT
Start: 2020-07-01 | End: 2020-07-02

## 2020-07-01 RX ORDER — SIMETHICONE 80 MG/1
80 TABLET, CHEWABLE ORAL EVERY 4 HOURS
Refills: 0 | Status: DISCONTINUED | OUTPATIENT
Start: 2020-07-01 | End: 2020-07-02

## 2020-07-01 RX ORDER — ASCORBIC ACID 60 MG
500 TABLET,CHEWABLE ORAL DAILY
Refills: 0 | Status: DISCONTINUED | OUTPATIENT
Start: 2020-07-01 | End: 2020-07-02

## 2020-07-01 RX ORDER — IBUPROFEN 200 MG
600 TABLET ORAL EVERY 6 HOURS
Refills: 0 | Status: COMPLETED | OUTPATIENT
Start: 2020-07-01 | End: 2021-05-30

## 2020-07-01 RX ADMIN — SODIUM CHLORIDE 3 MILLILITER(S): 9 INJECTION INTRAMUSCULAR; INTRAVENOUS; SUBCUTANEOUS at 13:40

## 2020-07-01 RX ADMIN — SODIUM CHLORIDE 3 MILLILITER(S): 9 INJECTION INTRAMUSCULAR; INTRAVENOUS; SUBCUTANEOUS at 07:32

## 2020-07-01 RX ADMIN — Medication 1000 MILLIUNIT(S)/MIN: at 05:09

## 2020-07-01 RX ADMIN — Medication 975 MILLIGRAM(S): at 05:20

## 2020-07-01 RX ADMIN — Medication 1 TABLET(S): at 11:56

## 2020-07-01 RX ADMIN — Medication 600 MILLIGRAM(S): at 23:33

## 2020-07-01 RX ADMIN — Medication 600 MILLIGRAM(S): at 07:58

## 2020-07-01 RX ADMIN — SODIUM CHLORIDE 3 MILLILITER(S): 9 INJECTION INTRAMUSCULAR; INTRAVENOUS; SUBCUTANEOUS at 23:29

## 2020-07-01 RX ADMIN — Medication 600 MILLIGRAM(S): at 11:56

## 2020-07-01 RX ADMIN — Medication 975 MILLIGRAM(S): at 14:58

## 2020-07-01 RX ADMIN — Medication 600 MILLIGRAM(S): at 17:50

## 2020-07-01 RX ADMIN — Medication 500 MILLIGRAM(S): at 11:56

## 2020-07-01 RX ADMIN — Medication 325 MILLIGRAM(S): at 11:56

## 2020-07-02 ENCOUNTER — TRANSCRIPTION ENCOUNTER (OUTPATIENT)
Age: 35
End: 2020-07-02

## 2020-07-02 VITALS
TEMPERATURE: 98 F | DIASTOLIC BLOOD PRESSURE: 77 MMHG | SYSTOLIC BLOOD PRESSURE: 124 MMHG | OXYGEN SATURATION: 99 % | HEART RATE: 68 BPM | RESPIRATION RATE: 17 BRPM

## 2020-07-02 DIAGNOSIS — O14.90 UNSPECIFIED PRE-ECLAMPSIA, UNSPECIFIED TRIMESTER: ICD-10-CM

## 2020-07-02 PROCEDURE — 83615 LACTATE (LD) (LDH) ENZYME: CPT

## 2020-07-02 PROCEDURE — G0463: CPT

## 2020-07-02 PROCEDURE — 86923 COMPATIBILITY TEST ELECTRIC: CPT

## 2020-07-02 PROCEDURE — 82570 ASSAY OF URINE CREATININE: CPT

## 2020-07-02 PROCEDURE — 84156 ASSAY OF PROTEIN URINE: CPT

## 2020-07-02 PROCEDURE — 87635 SARS-COV-2 COVID-19 AMP PRB: CPT

## 2020-07-02 PROCEDURE — 86769 SARS-COV-2 COVID-19 ANTIBODY: CPT

## 2020-07-02 PROCEDURE — 86900 BLOOD TYPING SEROLOGIC ABO: CPT

## 2020-07-02 PROCEDURE — 36415 COLL VENOUS BLD VENIPUNCTURE: CPT

## 2020-07-02 PROCEDURE — 85027 COMPLETE CBC AUTOMATED: CPT

## 2020-07-02 PROCEDURE — 86780 TREPONEMA PALLIDUM: CPT

## 2020-07-02 PROCEDURE — 81001 URINALYSIS AUTO W/SCOPE: CPT

## 2020-07-02 PROCEDURE — 85384 FIBRINOGEN ACTIVITY: CPT

## 2020-07-02 PROCEDURE — 82962 GLUCOSE BLOOD TEST: CPT

## 2020-07-02 PROCEDURE — 86901 BLOOD TYPING SEROLOGIC RH(D): CPT

## 2020-07-02 PROCEDURE — 59025 FETAL NON-STRESS TEST: CPT

## 2020-07-02 PROCEDURE — 88307 TISSUE EXAM BY PATHOLOGIST: CPT

## 2020-07-02 PROCEDURE — 59050 FETAL MONITOR W/REPORT: CPT

## 2020-07-02 PROCEDURE — 80053 COMPREHEN METABOLIC PANEL: CPT

## 2020-07-02 PROCEDURE — 84550 ASSAY OF BLOOD/URIC ACID: CPT

## 2020-07-02 PROCEDURE — 85730 THROMBOPLASTIN TIME PARTIAL: CPT

## 2020-07-02 PROCEDURE — 85610 PROTHROMBIN TIME: CPT

## 2020-07-02 PROCEDURE — 86850 RBC ANTIBODY SCREEN: CPT

## 2020-07-02 RX ORDER — IBUPROFEN 200 MG
1 TABLET ORAL
Qty: 40 | Refills: 0
Start: 2020-07-02 | End: 2020-07-11

## 2020-07-02 RX ORDER — SENNOSIDES/DOCUSATE SODIUM 8.6MG-50MG
2 TABLET ORAL
Qty: 60 | Refills: 0
Start: 2020-07-02 | End: 2020-07-31

## 2020-07-02 RX ORDER — FERROUS SULFATE 325(65) MG
1 TABLET ORAL
Qty: 30 | Refills: 0
Start: 2020-07-02 | End: 2020-07-31

## 2020-07-02 RX ORDER — SIMETHICONE 80 MG/1
2 TABLET, CHEWABLE ORAL
Qty: 56 | Refills: 0
Start: 2020-07-02 | End: 2020-07-08

## 2020-07-02 RX ADMIN — Medication 600 MILLIGRAM(S): at 05:42

## 2020-07-02 RX ADMIN — Medication 975 MILLIGRAM(S): at 09:22

## 2020-07-02 RX ADMIN — SODIUM CHLORIDE 3 MILLILITER(S): 9 INJECTION INTRAMUSCULAR; INTRAVENOUS; SUBCUTANEOUS at 05:26

## 2020-07-02 NOTE — DISCHARGE NOTE OB - LAUNCH MEDICATION RECONCILIATION
"Subjective:       Patient ID: Yulia Peralta is a 73 y.o. female.    Chief Complaint: Results (MRI) and Follow-up    She presents for follow up.  She has brought in her MRI chest report from [facility] for my review.  Today she reports that she is "one big hurt".  She has pain in her thighs, breasts, and back.  She remains concerned about the pulsation in her right neck.        Review of Systems   Musculoskeletal: Positive for arthralgias and back pain.       Objective:      Physical Exam   Constitutional: She is oriented to person, place, and time. She appears well-developed and well-nourished. No distress.   HENT:   Head: Normocephalic and atraumatic.   Right Ear: External ear normal.   Left Ear: External ear normal.   Eyes: Conjunctivae are normal. No scleral icterus.   Neck:   Pulsation right lateroanterior neck, no masses   Cardiovascular: Normal rate, regular rhythm and normal heart sounds.  Exam reveals no gallop and no friction rub.    No murmur heard.  Pulmonary/Chest: Effort normal and breath sounds normal. No respiratory distress. She has no wheezes. She has no rales.   Abdominal: Soft. Bowel sounds are normal. She exhibits no distension. There is no tenderness.   Neurological: She is alert and oriented to person, place, and time. No cranial nerve deficit.   Skin: Skin is warm and dry.   Psychiatric: She has a normal mood and affect.   Vitals reviewed.      Assessment:       1. Abnormal carotid pulse    2. Arthralgia, unspecified joint        Plan:       Yulia was seen today for results and follow-up.    Diagnoses and all orders for this visit:    Abnormal carotid pulse - no corresponding abnormality noted on recent MRI chest.  See scanned report.  Further imaging as below  -     US Carotid Bilateral; Future    Arthralgia, unspecified joint - c/o pain beginning today. F/u if persistent.  No specific currently.        F/u after imaging  "
none
<<-----Click here for Discharge Medication Review

## 2020-07-02 NOTE — DISCHARGE NOTE OB - MATERIALS PROVIDED
Back To Sleep Handout/Shaken Baby Prevention Handout/Breastfeeding Guide and Packet/Breastfeeding Log/Discharge Medication Information for Patients and Families Pocket Guide/[place]  Immunization Record/Tdap Vaccination (VIS Pub Date: 2012)/Breastfeeding Mother’s Support Group Information/Guide to Postpartum Care/Vaccinations/Birth Certificate Instructions/Letter of Medical Neccessity/[facility] Hearing Screen Program/[facility]  Screening Program

## 2020-07-02 NOTE — PROGRESS NOTE ADULT - SUBJECTIVE AND OBJECTIVE BOX
Patient seen at bedside resting comfortably offers no current complaints. Ambulating and voiding without difficulty.  Passing flatus and tolerating regular diet.  both breast/bottle feeding . Denies HA, CP, SOB, N/V/D, dizziness, palpitations,  worsening vaginal bleeding, or any other concerns.    Pt evaluated at bedside with Dr. Blake, attending on call    Vital Signs Last 24 Hrs  T(C): 36.8 (02 Jul 2020 05:13), Max: 37.2 (01 Jul 2020 18:36)  T(F): 98.3 (02 Jul 2020 05:13), Max: 99 (01 Jul 2020 18:36)  HR: 68 (02 Jul 2020 05:13) (68 - 84)  BP: 124/77 (02 Jul 2020 05:13) (118/72 - 129/76)  BP(mean): --  RR: 17 (02 Jul 2020 05:13) (16 - 17)  SpO2: 99% (02 Jul 2020 05:13) (97% - 99%)    Physical Exam:   Gen: A&Ox 3, NAD  Chest: CTA B/L  Cardiac: S1,S2; RRR  Breast: Soft, nontender, nonengorged  Abdomen: +BS, Soft, nontender, ND; Fundus firm below umbilicus  Gyn: mod lochia, repaired  Ext: Nontender, DTRs +2 b/l,  no worsening edema                          13.3   17.24 )-----------( 219      ( 01 Jul 2020 17:45 )             38.1

## 2020-07-02 NOTE — DISCHARGE NOTE OB - MEDICATION SUMMARY - MEDICATIONS TO TAKE
I will START or STAY ON the medications listed below when I get home from the hospital:    regular blood pressure kit   -- Apply on skin to affected area 2 times a day   -- Indication: For Preeclampsia    ibuprofen 600 mg oral tablet  -- 1 tab(s) by mouth every 6 hours, As Needed -for moderate pain   -- Do not take this drug if you are pregnant.  It is very important that you take or use this exactly as directed.  Do not skip doses or discontinue unless directed by your doctor.  May cause drowsiness or dizziness.  Obtain medical advice before taking any non-prescription drugs as some may affect the action of this medication.  Take with food or milk.    -- Indication: For moderate pain    ferrous sulfate 325 mg (65 mg elemental iron) oral tablet  -- 1 tab(s) by mouth once a day   -- Check with your doctor before becoming pregnant.  Do not chew, break, or crush.  May discolor urine or feces.    -- Indication: For anemia    Prenatal Multivitamins with Folic Acid 1 mg oral tablet  -- 1 tab(s) by mouth once a day  -- Indication: For Supplementation    Silvia-Colace 50 mg-8.6 mg oral tablet  -- 2 tab(s) by mouth once a day (at bedtime)   -- Medication should be taken with plenty of water.    -- Indication: For Stool softener    Gas-X 80 mg oral tablet, chewable  -- 2 tab(s) chewed every 6 hours, As Needed   -- Chew tablets before swallowing    -- Indication: For gas pain

## 2020-07-02 NOTE — DISCHARGE NOTE OB - PATIENT PORTAL LINK FT
You can access the FollowMyHealth Patient Portal offered by [facility] by registering at the following website: [email or web address]. By joining [employer]’s FollowMyHealth portal, you will also be able to view your health information using other applications (apps) compatible with our system.

## 2020-07-02 NOTE — DISCHARGE NOTE OB - PLAN OF CARE
routine vaginal delivery care, no tub baths, douching or sex for 6weeks, ambulate daily   follow up in 5-6wks with own obgyn post partum care and pain management see obstetrician in 2-3days for blood pressure check follow-up with clinician in 2-3 days for blood pressure check, return to Er blood pressure greater than 160/110,  if headache, epigastric pain, visual changes or increased swelling

## 2020-07-02 NOTE — DISCHARGE NOTE OB - CARE PROVIDER_API CALL
Elo Robledo)  Obstetrics and Gynecology  [address] [place] [address]  Phone: [phone]  Fax: [phone]  Follow Up Time: 1 month

## 2020-07-02 NOTE — DISCHARGE NOTE OB - CARE PLAN
Principal Discharge DX:	 (normal spontaneous vaginal delivery)  Goal:	post partum care and pain management  Assessment and plan of treatment:	routine vaginal delivery care, no tub baths, douching or sex for 6weeks, ambulate daily   follow up in 5-6wks with own obgyn  Secondary Diagnosis:	Preeclampsia  Goal:	see obstetrician in 2-3days for blood pressure check  Assessment and plan of treatment:	follow-up with clinician in 2-3 days for blood pressure check, return to Er blood pressure greater than 160/110,  if headache, epigastric pain, visual changes or increased swelling

## 2020-07-02 NOTE — PROGRESS NOTE ADULT - PROBLEM SELECTOR PLAN 1
-Continue pain management  -Encourage OOB and ambulation  -case management to set up home care  -BP kit prescription given to patient; pt instructed to take BP twice a day and log readings, bring log to office visit for blood pressure check in 2-3 days  -plan to discharge home today  -case d/w Dr. Robledo

## 2020-07-07 LAB — SURGICAL PATHOLOGY STUDY: SIGNIFICANT CHANGE UP

## 2022-03-03 ENCOUNTER — RESULT REVIEW (OUTPATIENT)
Age: 37
End: 2022-03-03

## 2022-08-07 ENCOUNTER — EMERGENCY (EMERGENCY)
Facility: HOSPITAL | Age: 37
LOS: 1 days | Discharge: HOME | End: 2022-08-07
Admitting: EMERGENCY MEDICINE

## 2022-08-07 VITALS
DIASTOLIC BLOOD PRESSURE: 97 MMHG | RESPIRATION RATE: 16 BRPM | TEMPERATURE: 98 F | HEART RATE: 77 BPM | SYSTOLIC BLOOD PRESSURE: 155 MMHG | HEIGHT: 61 IN | OXYGEN SATURATION: 100 %

## 2022-08-07 PROCEDURE — 99285 EMERGENCY DEPT VISIT HI MDM: CPT

## 2022-08-07 PROCEDURE — 99283 EMERGENCY DEPT VISIT LOW MDM: CPT

## 2022-08-07 RX ORDER — ACETAMINOPHEN 500 MG
650 TABLET ORAL ONCE
Refills: 0 | Status: COMPLETED | OUTPATIENT
Start: 2022-08-07 | End: 2022-08-07

## 2022-08-07 NOTE — ED ADULT TRIAGE NOTE - CHIEF COMPLAINT QUOTE
Pt c/o lower abdominal pain x 2 hours. Also c/o vaginal bleeding. LMP 7/15, and dysuria. Denies n/v/d, fevers/chills. PMHx DM

## 2022-08-08 LAB
ALBUMIN SERPL ELPH-MCNC: 4.2 G/DL — SIGNIFICANT CHANGE UP (ref 3.3–5)
ALP SERPL-CCNC: 54 U/L — SIGNIFICANT CHANGE UP (ref 40–120)
ALT FLD-CCNC: 52 U/L — HIGH (ref 4–33)
ANION GAP SERPL CALC-SCNC: 12 MMOL/L — SIGNIFICANT CHANGE UP (ref 7–14)
APPEARANCE UR: CLEAR — SIGNIFICANT CHANGE UP
AST SERPL-CCNC: 37 U/L — HIGH (ref 4–32)
BACTERIA # UR AUTO: NEGATIVE — SIGNIFICANT CHANGE UP
BASOPHILS # BLD AUTO: 0.06 K/UL — SIGNIFICANT CHANGE UP (ref 0–0.2)
BASOPHILS NFR BLD AUTO: 0.6 % — SIGNIFICANT CHANGE UP (ref 0–2)
BILIRUB SERPL-MCNC: 0.4 MG/DL — SIGNIFICANT CHANGE UP (ref 0.2–1.2)
BILIRUB UR-MCNC: NEGATIVE — SIGNIFICANT CHANGE UP
BUN SERPL-MCNC: 8 MG/DL — SIGNIFICANT CHANGE UP (ref 7–23)
CALCIUM SERPL-MCNC: 9.5 MG/DL — SIGNIFICANT CHANGE UP (ref 8.4–10.5)
CHLORIDE SERPL-SCNC: 100 MMOL/L — SIGNIFICANT CHANGE UP (ref 98–107)
CO2 SERPL-SCNC: 21 MMOL/L — LOW (ref 22–31)
COLOR SPEC: SIGNIFICANT CHANGE UP
CREAT SERPL-MCNC: 0.52 MG/DL — SIGNIFICANT CHANGE UP (ref 0.5–1.3)
DIFF PNL FLD: ABNORMAL
EGFR: 124 ML/MIN/1.73M2 — SIGNIFICANT CHANGE UP
EOSINOPHIL # BLD AUTO: 0.35 K/UL — SIGNIFICANT CHANGE UP (ref 0–0.5)
EOSINOPHIL NFR BLD AUTO: 3.3 % — SIGNIFICANT CHANGE UP (ref 0–6)
EPI CELLS # UR: 1 /HPF — SIGNIFICANT CHANGE UP (ref 0–5)
GLUCOSE SERPL-MCNC: 112 MG/DL — HIGH (ref 70–99)
GLUCOSE UR QL: NEGATIVE — SIGNIFICANT CHANGE UP
HCG SERPL-ACNC: 416.2 MIU/ML — SIGNIFICANT CHANGE UP
HCT VFR BLD CALC: 34.1 % — LOW (ref 34.5–45)
HGB BLD-MCNC: 11.7 G/DL — SIGNIFICANT CHANGE UP (ref 11.5–15.5)
HYALINE CASTS # UR AUTO: 1 /LPF — SIGNIFICANT CHANGE UP (ref 0–7)
IANC: 6.31 K/UL — SIGNIFICANT CHANGE UP (ref 1.8–7.4)
IMM GRANULOCYTES NFR BLD AUTO: 0.2 % — SIGNIFICANT CHANGE UP (ref 0–1.5)
KETONES UR-MCNC: NEGATIVE — SIGNIFICANT CHANGE UP
LEUKOCYTE ESTERASE UR-ACNC: NEGATIVE — SIGNIFICANT CHANGE UP
LYMPHOCYTES # BLD AUTO: 29.2 % — SIGNIFICANT CHANGE UP (ref 13–44)
LYMPHOCYTES # BLD AUTO: 3.06 K/UL — SIGNIFICANT CHANGE UP (ref 1–3.3)
MCHC RBC-ENTMCNC: 28.7 PG — SIGNIFICANT CHANGE UP (ref 27–34)
MCHC RBC-ENTMCNC: 34.3 GM/DL — SIGNIFICANT CHANGE UP (ref 32–36)
MCV RBC AUTO: 83.6 FL — SIGNIFICANT CHANGE UP (ref 80–100)
MONOCYTES # BLD AUTO: 0.69 K/UL — SIGNIFICANT CHANGE UP (ref 0–0.9)
MONOCYTES NFR BLD AUTO: 6.6 % — SIGNIFICANT CHANGE UP (ref 2–14)
NEUTROPHILS # BLD AUTO: 6.31 K/UL — SIGNIFICANT CHANGE UP (ref 1.8–7.4)
NEUTROPHILS NFR BLD AUTO: 60.1 % — SIGNIFICANT CHANGE UP (ref 43–77)
NITRITE UR-MCNC: NEGATIVE — SIGNIFICANT CHANGE UP
NRBC # BLD: 0 /100 WBCS — SIGNIFICANT CHANGE UP
NRBC # FLD: 0 K/UL — SIGNIFICANT CHANGE UP
PH UR: 6 — SIGNIFICANT CHANGE UP (ref 5–8)
PLATELET # BLD AUTO: 355 K/UL — SIGNIFICANT CHANGE UP (ref 150–400)
POTASSIUM SERPL-MCNC: 4.2 MMOL/L — SIGNIFICANT CHANGE UP (ref 3.5–5.3)
POTASSIUM SERPL-SCNC: 4.2 MMOL/L — SIGNIFICANT CHANGE UP (ref 3.5–5.3)
PROT SERPL-MCNC: 7.5 G/DL — SIGNIFICANT CHANGE UP (ref 6–8.3)
PROT UR-MCNC: NEGATIVE — SIGNIFICANT CHANGE UP
RBC # BLD: 4.08 M/UL — SIGNIFICANT CHANGE UP (ref 3.8–5.2)
RBC # FLD: 12.5 % — SIGNIFICANT CHANGE UP (ref 10.3–14.5)
RBC CASTS # UR COMP ASSIST: 14 /HPF — HIGH (ref 0–4)
SODIUM SERPL-SCNC: 133 MMOL/L — LOW (ref 135–145)
SP GR SPEC: 1.01 — SIGNIFICANT CHANGE UP (ref 1–1.05)
UROBILINOGEN FLD QL: SIGNIFICANT CHANGE UP
WBC # BLD: 10.49 K/UL — SIGNIFICANT CHANGE UP (ref 3.8–10.5)
WBC # FLD AUTO: 10.49 K/UL — SIGNIFICANT CHANGE UP (ref 3.8–10.5)
WBC UR QL: 1 /HPF — SIGNIFICANT CHANGE UP (ref 0–5)

## 2022-08-08 PROCEDURE — 76830 TRANSVAGINAL US NON-OB: CPT | Mod: 26

## 2022-08-08 RX ORDER — IBUPROFEN 200 MG
1 TABLET ORAL
Qty: 20 | Refills: 0
Start: 2022-08-08

## 2022-08-08 RX ADMIN — Medication 650 MILLIGRAM(S): at 00:08

## 2022-08-08 NOTE — ED PROVIDER NOTE - DOMESTIC TRAVEL HIGH RISK QUESTION
[facility] EMERGENCY DEPARTMENT  [address]  [place] [address]  Phone: [phone]    January 16, 2017        Delfina Freeman  [address]  [facility] [place] [address]          To whom it may concern:    This patient missed school 1/17/2017  due to an illness. She can return when her fever is gone    Please contact me for questions or concerns.        Sincerely,         Dr Talon Lundberg   No

## 2022-08-08 NOTE — ED PROVIDER NOTE - PATIENT PORTAL LINK FT
You can access the FollowMyHealth Patient Portal offered by [facility] by registering at the following website: [email or web address]. By joining [employer]’s FollowMyHealth portal, you will also be able to view your health information using other applications (apps) compatible with our system.

## 2022-08-08 NOTE — CONSULT NOTE ADULT - SUBJECTIVE AND OBJECTIVE BOX
ANA MENA  36y  Female [id number]    HPI:  37 y/o  LMP 7/15 presenting to ED with vaginal bleeding with clots and cramping; Gyn consulted in light of beta HCG of 414.    Patient states that she normally gets regular periods, without heavy bleeding. Had bleeding starting today that was heavy and with a blood clot, that prompted her to come in. Denies lightheadedness, dizziness, fevers, chills, current abdominal pain, or current bleeding. Incidentally found to be pregnant on workup. Would be a desired pregnancy.    Name of GYN Physician: none    ObHx: 2017 FT  ([facility] service),  FT  ([facility])  GynHx: Denies fibroids, cysts, endometriosis, STI's, Abnormal pap smears   PMHx: T2DM  SurgHx: denies  Meds: Metformin 500 BID  Allergies: NKDA  Social History: Denies smoking use, drug use, alcohol use, +occasional social alcohol use    Vital Signs Last 24 Hrs  T(C): 36.8 (07 Aug 2022 22:32), Max: 36.8 (07 Aug 2022 22:32)  T(F): 98.2 (07 Aug 2022 22:32), Max: 98.2 (07 Aug 2022 22:32)  HR: 77 (07 Aug 2022 22:32) (77 - 77)  BP: 155/97 (07 Aug 2022 22:32) (155/97 - 155/97)  BP(mean): --  RR: 16 (07 Aug 2022 22:32) (16 - 16)  SpO2: 100% (07 Aug 2022 22:32) (100% - 100%)    Parameters below as of 07 Aug 2022 22:32  Patient On (Oxygen Delivery Method): room air    Physical Exam:   General: sitting comfortably in bed, NAD   Abd: Soft, non-tender, non-distended..    :  No bleeding on pad. External labia wnl. Bimanual exam with no cervical motion tenderness, uterus wnl, adnexa non palpable b/l.  Cervix closed.  Speculum Exam: No active bleeding from os. Physiologic discharge, scant dark red blood.    Chaperoned by Aba CARLISLE    LABS:             11.7   10.49 )-----------( 355      ( 07 Aug 2022 23:59 )             34.1     08-07    133<L>  |  100  |  8   ----------------------------<  112<H>  4.2   |  21<L>  |  0.52    Ca    9.5      07 Aug 2022 23:59    TPro  7.5  /  Alb  4.2  /  TBili  0.4  /  DBili  x   /  AST  37<H>  /  ALT  52<H>  /  AlkPhos  54      I&O's Detail      Urinalysis Basic - ( 07 Aug 2022 23:59 )    Color: Light Yellow / Appearance: Clear / S.014 / pH: x  Gluc: x / Ketone: Negative  / Bili: Negative / Urobili: <2 mg/dL   Blood: x / Protein: Negative / Nitrite: Negative   Leuk Esterase: Negative / RBC: 14 /HPF / WBC 1 /HPF   Sq Epi: x / Non Sq Epi: 1 /HPF / Bacteria: Negative    HCG Quantitative, Serum (22 @ 23:59)    HCG Quantitative, Serum: 416.2: For pregnancy evaluation the reference values are as follows:  Negative: <5 mIU/mL  Indeterminate: 5-25 mIU/mL (suggest repeat testing in 72hrs)  Positive: >25 mIU/mL  Note: hCG results of false positive and false negative for pregnancy are  rare, but can occur with this, and other hCG tests. Silvia- and  post-menopausal females may have mildly elevated hCG concentrations,  usually less than 14 mIU/mL, that are constant over time.  Weeks of pregnancy:           mIU/mL  ------------------         -------          3                                6 -      71          4                              10 -     750          5                             220 -   7,100          6                             160 -  32,000          7                3,700 - 164,000          8                          32,000 - 150,000          9                          64,000 - 151,000         10                         47,000 - 187,000         12                         28,000 - 211,000         14                         14,000 -  63,000         15                         12,000 -  71,000         16 - 18                   8,000 -  58,000 mIU/mL        RADIOLOGY & ADDITIONAL STUDIES:    < from: US Transvaginal (22 @ 01:26) >  ACC: [id number] EXAM:  US TRANSVAGINAL                          PROCEDURE DATE:  2022          INTERPRETATION:  CLINICAL INFORMATION: Sudden onset pelvic pain and   vaginal bleeding, rule out ovarian cyst.    LMP: 7/15/2022    COMPARISON: None available.    TECHNIQUE:  Endovaginal pelvic sonogram only. Color and Spectral Doppler was   performed.    FINDINGS:  Uterus: 9.1 cm x 4.4 cm x 5.5 cm. Within normal limits. Small amount of   endocervical fluid.  Endometrium: 7 mm. Within normal limits.    Right ovary: 3.1 cm x 1.9 cm x 1.9 cm. Within normal limits. Normal   arterial and venous waveforms.  Left ovary: 2.4 cm x 1.6 cm x 1.8 cm. Within normal limits. Normal   arterial and venous waveforms.    Fluid: None.    IMPRESSION:  Normal pelvic sonogram.        --- End of Report ---            DALTON ESPINAL MD; Attending Radiologist  This document has been electronically signed. Aug  8 2022  1:58AM    < end of copied text >

## 2022-08-08 NOTE — ED PROVIDER NOTE - CLINICAL SUMMARY MEDICAL DECISION MAKING FREE TEXT BOX
Pt is a 37 y/o w/ no reported PMH, p/w lower pelvic pain and vaginal bleeding x 1 day.  Pt p/w sudden onset of lower pelvic pain & vaginal bleeding.  Sx possibly d/t rupture ovarian cyst.  Will provide routine labs, and transvaginal U/S

## 2022-08-08 NOTE — ED PROVIDER NOTE - OBJECTIVE STATEMENT
Pt is a 37 y/o w/ no reported PMH, p/w lower pelvic pain and vaginal bleeding x 1 day. States her LMP was July 15th. Today, she reports vaginal bleeding (1 pads with clots) that has diminished since initial event. Denies any prior Gynecological Hx including fibroid, endometriosis and ovarian cyst. Denies dizziness and LOC.

## 2022-08-08 NOTE — ED ADULT NURSE NOTE - OBJECTIVE STATEMENT
Pt received in intake room 12. Pt A&Ox4, c/o pelvic pain and vaginal bleeding starting tonight. Also endorses dysuria. Denies chest pain, sob, abd pain, ha, dizziness, n/v/d, fevers/chills. Respirations even and unlabored, no accessory muscle use. 20G placed to L arm.\, labs sent. Stretcher in lowest position, side rail up, call bell within reach.

## 2022-08-08 NOTE — ED PROVIDER NOTE - NSFOLLOWUPINSTRUCTIONS_ED_ALL_ED_FT
Please follow up in 2 days for repeat blood work up.   A threatened miscarriage occurs when a woman has vaginal bleeding during the first 20 weeks of pregnancy but the pregnancy has not ended. If vaginal bleeding occurs during this time, the health care provider will do tests to make sure the woman is still pregnant. The woman's condition may be considered a threatened miscarriage if the tests show:  •That she is still pregnant.    •That the embryo or unborn baby (fetus) inside the uterus is still growing.    A threatened miscarriage does not mean your pregnancy will end, but it does increase the risk of losing your pregnancy (miscarriage).    What are the causes?    The cause of this condition is usually not known.    What increases the risk?    The following factors may make a pregnant woman more likely to have a miscarriage:    Certain medical conditions     •Conditions that affect the hormone balance in the body, such as thyroid disease or polycystic ovary syndrome.    •Diabetes.    •Autoimmune disorders.    •Infections.    •Bleeding disorders.    •Obesity.    Lifestyle factors     •Using products with tobacco or nicotine or being exposed to tobacco smoke.    •Having alcohol.    •Having large amounts of caffeine.    •Recreational drug use.    Problems with reproductive organs or structures     •Cervical insufficiency. This is when the the lowest part of the uterus (cervix) opens and thins before pregnancy is at term.    •Having a condition called Asherman syndrome, which causes scarring in the uterus or causes the uterus to be abnormal in structure.    •Fibrous growths, called fibroids, in the uterus.    •Congenital abnormalities. These problems are present at birth.    •Infection of the cervix or uterus.    Personal or medical history     •Injury (trauma).    •Having had a miscarriage before.    •Being younger than age 18 or older than age 35.    •Exposure to harmful substances in the environment. This may include radiation or heavy metals, such as lead.     •Using certain medicines.    What are the signs or symptoms?    Symptoms of this condition include:  •Vaginal bleeding or spotting, with or without cramps or pain.    •Mild pain or cramps in your abdomen.    How is this diagnosed?     You may have tests to check whether you are still pregnant. These tests will be done if you have bleeding, with or without pain, in your abdomen before the 20th week of pregnancy. These tests include:  •Ultrasound.    •A physical exam.    •Measurement of your baby's heart rate.    •Lab tests, such as blood tests, urine tests, or swabs for infection.    You may be diagnosed with a threatened miscarriage if:  •Ultrasound testing shows that you are still pregnant.    •Your baby's heart rate is strong.    •A physical exam shows that your cervix is closed.    •Blood tests confirm that you are still pregnant.    How is this treated?    No treatments have been shown to prevent a threatened miscarriage from going on to a complete miscarriage. However, the right home care is important.    Follow these instructions at home:    •Get plenty of rest.    • Do not have sex, douche, or put anything in your vagina, such as tampons, until your health care provider says it is okay.    • Do not smoke or use recreational drugs.    • Do not drink alcohol.    •Avoid caffeine.    •Keep all follow-up prenatal visits. This is important.    Contact a health care provider if:    •You have light vaginal bleeding or spotting while pregnant.    •You have pain or cramping in your abdomen.    •You have a fever.    Get help right away if:    •Heavy bleeding soaks through 2 large sanitary pads an hour for more than 2 hours.    •Blood clots come out of your vagina.    •Tissue comes out of your vagina.    •You leak fluid, or you have a gush of fluid from your vagina.    •You have severe low back pain or cramps in your abdomen.    •You have a fever, chills, and severe pain in the abdomen.

## 2022-08-10 ENCOUNTER — APPOINTMENT (OUTPATIENT)
Dept: OBGYN | Facility: HOSPITAL | Age: 37
End: 2022-08-10

## 2022-08-10 ENCOUNTER — OUTPATIENT (OUTPATIENT)
Dept: OUTPATIENT SERVICES | Facility: HOSPITAL | Age: 37
LOS: 1 days | End: 2022-08-10

## 2022-08-10 ENCOUNTER — RESULT REVIEW (OUTPATIENT)
Age: 37
End: 2022-08-10

## 2022-08-10 VITALS
TEMPERATURE: 98.2 F | BODY MASS INDEX: 28.66 KG/M2 | HEART RATE: 73 BPM | WEIGHT: 146 LBS | DIASTOLIC BLOOD PRESSURE: 73 MMHG | HEIGHT: 60 IN | SYSTOLIC BLOOD PRESSURE: 145 MMHG

## 2022-08-10 LAB — HCG SERPL-ACNC: 43.5 MIU/ML — SIGNIFICANT CHANGE UP

## 2022-08-10 PROCEDURE — 99213 OFFICE O/P EST LOW 20 MIN: CPT | Mod: GE

## 2022-08-10 NOTE — CHART NOTE - NSCHARTNOTEFT_GEN_A_CORE
Called patient on phone number provided, to remind patient of her appointment today in clinic for beta-hCG follow-up. Patient did not .  Unable to leave a VM as her voicemail is not set-up. Called patient on phone number provided, to remind patient of her appointment today in clinic for beta-hCG follow-up. Patient did not .  Unable to leave a VM as her voicemail is not set-up.    Of note, patient needs a T&S drawn.

## 2022-08-11 DIAGNOSIS — Z34.90 ENCOUNTER FOR SUPERVISION OF NORMAL PREGNANCY, UNSPECIFIED, UNSPECIFIED TRIMESTER: ICD-10-CM

## 2022-08-16 ENCOUNTER — NON-APPOINTMENT (OUTPATIENT)
Age: 37
End: 2022-08-16

## 2022-08-17 ENCOUNTER — RESULT REVIEW (OUTPATIENT)
Age: 37
End: 2022-08-17

## 2022-08-17 ENCOUNTER — OUTPATIENT (OUTPATIENT)
Dept: OUTPATIENT SERVICES | Facility: HOSPITAL | Age: 37
LOS: 1 days | End: 2022-08-17

## 2022-08-17 ENCOUNTER — APPOINTMENT (OUTPATIENT)
Dept: OBGYN | Facility: HOSPITAL | Age: 37
End: 2022-08-17

## 2022-08-17 VITALS
HEART RATE: 64 BPM | DIASTOLIC BLOOD PRESSURE: 84 MMHG | BODY MASS INDEX: 27.38 KG/M2 | HEIGHT: 61 IN | SYSTOLIC BLOOD PRESSURE: 140 MMHG | TEMPERATURE: 97.6 F | WEIGHT: 145 LBS

## 2022-08-17 LAB — HCG SERPL-ACNC: <5 MIU/ML — SIGNIFICANT CHANGE UP

## 2022-08-17 PROCEDURE — 99213 OFFICE O/P EST LOW 20 MIN: CPT | Mod: GE

## 2022-08-19 DIAGNOSIS — O36.80X0 PREGNANCY WITH INCONCLUSIVE FETAL VIABILITY, NOT APPLICABLE OR UNSPECIFIED: ICD-10-CM

## 2022-12-12 ENCOUNTER — NON-APPOINTMENT (OUTPATIENT)
Age: 37
End: 2022-12-12

## 2023-03-28 ENCOUNTER — EMERGENCY (EMERGENCY)
Facility: HOSPITAL | Age: 38
LOS: 1 days | Discharge: HOME | End: 2023-03-28
Attending: EMERGENCY MEDICINE | Admitting: EMERGENCY MEDICINE
Payer: MEDICAID

## 2023-03-28 VITALS
RESPIRATION RATE: 16 BRPM | SYSTOLIC BLOOD PRESSURE: 136 MMHG | OXYGEN SATURATION: 100 % | TEMPERATURE: 99 F | DIASTOLIC BLOOD PRESSURE: 80 MMHG | HEART RATE: 79 BPM

## 2023-03-28 PROCEDURE — 99285 EMERGENCY DEPT VISIT HI MDM: CPT

## 2023-03-28 PROCEDURE — 70450 CT HEAD/BRAIN W/O DYE: CPT | Mod: 26,MA

## 2023-03-28 RX ORDER — METOCLOPRAMIDE HCL 10 MG
10 TABLET ORAL ONCE
Refills: 0 | Status: COMPLETED | OUTPATIENT
Start: 2023-03-28 | End: 2023-03-28

## 2023-03-28 RX ORDER — KETOROLAC TROMETHAMINE 30 MG/ML
15 SYRINGE (ML) INJECTION ONCE
Refills: 0 | Status: DISCONTINUED | OUTPATIENT
Start: 2023-03-28 | End: 2023-03-28

## 2023-03-28 RX ORDER — METOCLOPRAMIDE HCL 10 MG
5 TABLET ORAL ONCE
Refills: 0 | Status: DISCONTINUED | OUTPATIENT
Start: 2023-03-28 | End: 2023-03-28

## 2023-03-28 RX ORDER — SODIUM CHLORIDE 9 MG/ML
1000 INJECTION INTRAMUSCULAR; INTRAVENOUS; SUBCUTANEOUS ONCE
Refills: 0 | Status: COMPLETED | OUTPATIENT
Start: 2023-03-28 | End: 2023-03-28

## 2023-03-28 RX ADMIN — Medication 10 MILLIGRAM(S): at 20:40

## 2023-03-28 RX ADMIN — Medication 15 MILLIGRAM(S): at 20:40

## 2023-03-28 RX ADMIN — SODIUM CHLORIDE 1000 MILLILITER(S): 9 INJECTION INTRAMUSCULAR; INTRAVENOUS; SUBCUTANEOUS at 20:40

## 2023-03-28 NOTE — ED PROVIDER NOTE - PHYSICAL EXAMINATION
Constitutional: VS reviewed. Alert and orientedx3, well appearing, no apparent distress  Head: Atraumatic  Eyes: PERRL, EOMI  Mouth: Normal dentition, no erythema or swelling  Neck: no TTP or stiffness  CV: RRR  Lungs: Clear and equal bilaterally, no wheezes, rales or crackles  Abdomen: Soft, nondistended, nontender  MSK: No deformities  Skin: Warm and dry. As visualized no rashes, lesions, bruising or erythema  Neuro: Strength 5/5 in all extremities, Sensation intact. normal gait.   Lymph: No pitting edema in extremities.

## 2023-03-28 NOTE — ED ADULT TRIAGE NOTE - MODE OF ARRIVAL
COVID-19 PCR test completed. Patient handout For Patients Who Have Been Tested for Covid-19 (Coronavirus) was given to the patient, which includes test result notification process.     [employer] in [employer]

## 2023-03-28 NOTE — ED PROVIDER NOTE - PATIENT PORTAL LINK FT
You can access the FollowMyHealth Patient Portal offered by [facility] by registering at the following website: [email or web address]. By joining [employer]’s FollowMyHealth portal, you will also be able to view your health information using other applications (apps) compatible with our system.

## 2023-03-28 NOTE — ED ADULT TRIAGE NOTE - CHIEF COMPLAINT QUOTE
c/o right sided headache and dizziness x 1 week. Denies any N/V, fever or chills. PMHX HLD, HTN, DM2. GQ=[id number]

## 2023-03-28 NOTE — ED PROVIDER NOTE - NSFOLLOWUPINSTRUCTIONS_ED_ALL_ED_FT
You were seen in the ED today for headaches.    Your workup included a catscan of the head and medications. Your results are included in your paperwork. You were seen in the ED today for headaches.    Your workup included a catscan of the head and medications. Your results are included in your paperwork.    Headache    A headache is pain or discomfort felt around the head or neck area. The specific cause of a headache may not be found as there are many types including tension headaches, migraine headaches, and cluster headaches. Watch your condition for any changes. Things you can do to manage your pain include taking over the counter and prescription medications as instructed by your health care provider, lying down in a dark quiet room, limiting stress, getting regular sleep, and refraining from alcohol and tobacco products.    SEEK IMMEDIATE MEDICAL CARE IF YOU HAVE ANY OF THE FOLLOWING SYMPTOMS: fever, vomiting, stiff neck, loss of vision, problems with speech, muscle weakness, loss of balance, trouble walking, passing out, or confusion.

## 2023-03-28 NOTE — ED PROVIDER NOTE - OBJECTIVE STATEMENT
38 y/o F with PMHx of HTN, HLD presents to the ED for 3-4 of severe right sided headache. Pt states she has history of headaches but has never been formally diagnosed with migraines. Pt states she went to her PCP and was given topiramate to start today but has not taken it yet. Pt states this is her worst headache and has associated nausea but no vomiting. Pt denies photophobia, weakness or numbness/tingling. Pt states motrin provides minimal relief. Pt states she was supposed to go to neurologist but has been unable to get an appointment. Pt denies fevers, chills, vision changes, neck or back pain, CP, SOB, abdominal pain, v/d/c, dysuria, hematuria, difficulty walking.     (Jacob): Hellen ID# [id number]

## 2023-03-28 NOTE — ED ADULT NURSE NOTE - OBJECTIVE STATEMENT
report rcd from day shift rn. pt a&ox4 c/o severe headache consistent with chief complaint. pt no neuro or sensory deficits. pt denies visual disturbances. pt denies chest pain, sob, nausea, vomiting, dizziness, headache, fevers/chills. pt abdomen soft and nondistended. pt denies pmhx. 20g to the LAC. pt medicated as per md orders. pt respirations even and unlabored with no accessory muscle use. pt in NAD and pending DC at this time.

## 2023-03-28 NOTE — ED PROVIDER NOTE - CLINICAL SUMMARY MEDICAL DECISION MAKING FREE TEXT BOX
36 y/o F with PMHx of HTN, HLD presents to the ED for 3-4 of severe right sided headache. Pt states this is the worst headache she has ever had. Pt resting comfortably. No focal neuro deficits. Differentials include but not limited to tension headache, migraine, intracranial mass, ICH. Most likely to be migraine due to similarity to other headaches in the past. Pt has never had imaging done for headaches so will do CT head noncon to assess for masses or ICH. Will give analgesia and reassess. Dispo likely home with neuro follow up.

## 2023-03-28 NOTE — ED PROVIDER NOTE - ATTENDING CONTRIBUTION TO CARE
GEN - NAD; well appearing; A+O x3   HEAD - NC/AT   EYES- PERRL, EOMI  ENT: Airway patent, mmm, Oral cavity and pharynx normal. No inflammation, swelling, exudate, or lesions.  NECK: Neck supple  PULMONARY - CTA b/l, symmetric breath sounds.   CARDIAC -s1s2, RRR, no M,G,R  ABDOMEN - +BS, ND, NT, soft, no guarding, no rebound, no masses   BACK - no CVA tenderness, Normal  spine   EXTREMITIES - FROM, symmetric pulses, capillary refill < 2 seconds, no edema   SKIN - no rash or bruising   NEUROLOGIC - ao3, cn2-12 intact, 5/5 strength in all extremities, sensation grossly intact, f-n nl, no dysdiadochokinesia, gait steady  PSYCH -nl mood/affect, nl insight.  37-year-old female history of hypertension hyperlipidemia chronic headaches with no history of work-up for them presenting to the ED with 3 to 4 days of right-sided headache.  Reports that it started initially on the more milder side and has worsened over the last few days, has been constant with waxing and waning periods, went to her regular doctor yesterday who prescribed her topiramate but has not yet started it.  She does not have any weakness numbness vision changes photophobia vomiting imbalance confusion speech problems fever chillsNeck pain stiffness chest pain shortness of breath abdominal pain diarrhea dysuria.  No recent travel or sick contacts.  On exam she is well-appearing, unlabored breathing, clear lungs, abdomen soft nontender, neuro exam with no localizing deficits, neck supple with free range of motion.  Given normal exam discussed with patient option for CT given long history of headache with no prior imaging as well as symptomatic treatment in ED to eval for diff including but not limited to mass, more likely migraine.  Patient opts to do CT here with symptomatic treatment and will follow up with neuro if findings nonactionable.   da[place], [place] [id number] [facility] language utilized for interview.

## 2023-03-28 NOTE — ED ADULT NURSE NOTE - CHIEF COMPLAINT QUOTE
c/o right sided headache and dizziness x 1 week. Denies any N/V, fever or chills. PMHX HLD, HTN, DM2. AS=[id number]

## 2023-06-22 ENCOUNTER — NON-APPOINTMENT (OUTPATIENT)
Age: 38
End: 2023-06-22

## 2023-09-08 ENCOUNTER — EMERGENCY (EMERGENCY)
Facility: HOSPITAL | Age: 38
LOS: 1 days | Discharge: HOME | End: 2023-09-08
Admitting: EMERGENCY MEDICINE
Payer: MEDICAID

## 2023-09-08 VITALS
SYSTOLIC BLOOD PRESSURE: 119 MMHG | OXYGEN SATURATION: 100 % | TEMPERATURE: 99 F | DIASTOLIC BLOOD PRESSURE: 72 MMHG | HEART RATE: 77 BPM | RESPIRATION RATE: 17 BRPM

## 2023-09-08 VITALS
RESPIRATION RATE: 16 BRPM | OXYGEN SATURATION: 100 % | DIASTOLIC BLOOD PRESSURE: 82 MMHG | TEMPERATURE: 99 F | SYSTOLIC BLOOD PRESSURE: 129 MMHG | HEART RATE: 80 BPM

## 2023-09-08 LAB
ALBUMIN SERPL ELPH-MCNC: 4.4 G/DL — SIGNIFICANT CHANGE UP (ref 3.3–5)
ALP SERPL-CCNC: 57 U/L — SIGNIFICANT CHANGE UP (ref 40–120)
ALT FLD-CCNC: 45 U/L — HIGH (ref 4–33)
ANION GAP SERPL CALC-SCNC: 13 MMOL/L — SIGNIFICANT CHANGE UP (ref 7–14)
APPEARANCE UR: CLEAR — SIGNIFICANT CHANGE UP
AST SERPL-CCNC: 35 U/L — HIGH (ref 4–32)
BASOPHILS # BLD AUTO: 0.04 K/UL — SIGNIFICANT CHANGE UP (ref 0–0.2)
BASOPHILS NFR BLD AUTO: 0.4 % — SIGNIFICANT CHANGE UP (ref 0–2)
BILIRUB SERPL-MCNC: 0.5 MG/DL — SIGNIFICANT CHANGE UP (ref 0.2–1.2)
BILIRUB UR-MCNC: NEGATIVE — SIGNIFICANT CHANGE UP
BLD GP AB SCN SERPL QL: NEGATIVE — SIGNIFICANT CHANGE UP
BUN SERPL-MCNC: 6 MG/DL — LOW (ref 7–23)
CALCIUM SERPL-MCNC: 9.5 MG/DL — SIGNIFICANT CHANGE UP (ref 8.4–10.5)
CHLORIDE SERPL-SCNC: 102 MMOL/L — SIGNIFICANT CHANGE UP (ref 98–107)
CO2 SERPL-SCNC: 22 MMOL/L — SIGNIFICANT CHANGE UP (ref 22–31)
COLOR SPEC: YELLOW — SIGNIFICANT CHANGE UP
CREAT SERPL-MCNC: 0.54 MG/DL — SIGNIFICANT CHANGE UP (ref 0.5–1.3)
DIFF PNL FLD: NEGATIVE — SIGNIFICANT CHANGE UP
EGFR: 122 ML/MIN/1.73M2 — SIGNIFICANT CHANGE UP
EOSINOPHIL # BLD AUTO: 0.04 K/UL — SIGNIFICANT CHANGE UP (ref 0–0.5)
EOSINOPHIL NFR BLD AUTO: 0.4 % — SIGNIFICANT CHANGE UP (ref 0–6)
GLUCOSE SERPL-MCNC: 118 MG/DL — HIGH (ref 70–99)
GLUCOSE UR QL: NEGATIVE MG/DL — SIGNIFICANT CHANGE UP
HCT VFR BLD CALC: 32.7 % — LOW (ref 34.5–45)
HGB BLD-MCNC: 11.1 G/DL — LOW (ref 11.5–15.5)
IANC: 6.84 K/UL — SIGNIFICANT CHANGE UP (ref 1.8–7.4)
IMM GRANULOCYTES NFR BLD AUTO: 0.4 % — SIGNIFICANT CHANGE UP (ref 0–0.9)
KETONES UR-MCNC: NEGATIVE MG/DL — SIGNIFICANT CHANGE UP
LEUKOCYTE ESTERASE UR-ACNC: ABNORMAL
LYMPHOCYTES # BLD AUTO: 2.43 K/UL — SIGNIFICANT CHANGE UP (ref 1–3.3)
LYMPHOCYTES # BLD AUTO: 24.4 % — SIGNIFICANT CHANGE UP (ref 13–44)
MCHC RBC-ENTMCNC: 27.1 PG — SIGNIFICANT CHANGE UP (ref 27–34)
MCHC RBC-ENTMCNC: 33.9 GM/DL — SIGNIFICANT CHANGE UP (ref 32–36)
MCV RBC AUTO: 80 FL — SIGNIFICANT CHANGE UP (ref 80–100)
MONOCYTES # BLD AUTO: 0.57 K/UL — SIGNIFICANT CHANGE UP (ref 0–0.9)
MONOCYTES NFR BLD AUTO: 5.7 % — SIGNIFICANT CHANGE UP (ref 2–14)
NEUTROPHILS # BLD AUTO: 6.84 K/UL — SIGNIFICANT CHANGE UP (ref 1.8–7.4)
NEUTROPHILS NFR BLD AUTO: 68.7 % — SIGNIFICANT CHANGE UP (ref 43–77)
NITRITE UR-MCNC: NEGATIVE — SIGNIFICANT CHANGE UP
NRBC # BLD: 0 /100 WBCS — SIGNIFICANT CHANGE UP (ref 0–0)
NRBC # FLD: 0 K/UL — SIGNIFICANT CHANGE UP (ref 0–0)
PH UR: 7 — SIGNIFICANT CHANGE UP (ref 5–8)
PLATELET # BLD AUTO: 390 K/UL — SIGNIFICANT CHANGE UP (ref 150–400)
POTASSIUM SERPL-MCNC: 3.7 MMOL/L — SIGNIFICANT CHANGE UP (ref 3.5–5.3)
POTASSIUM SERPL-SCNC: 3.7 MMOL/L — SIGNIFICANT CHANGE UP (ref 3.5–5.3)
PROT SERPL-MCNC: 8.3 G/DL — SIGNIFICANT CHANGE UP (ref 6–8.3)
PROT UR-MCNC: NEGATIVE MG/DL — SIGNIFICANT CHANGE UP
RBC # BLD: 4.09 M/UL — SIGNIFICANT CHANGE UP (ref 3.8–5.2)
RBC # FLD: 13.3 % — SIGNIFICANT CHANGE UP (ref 10.3–14.5)
RH IG SCN BLD-IMP: POSITIVE — SIGNIFICANT CHANGE UP
SODIUM SERPL-SCNC: 137 MMOL/L — SIGNIFICANT CHANGE UP (ref 135–145)
SP GR SPEC: 1 — LOW (ref 1–1.03)
UROBILINOGEN FLD QL: 0.2 MG/DL — SIGNIFICANT CHANGE UP (ref 0.2–1)
WBC # BLD: 9.96 K/UL — SIGNIFICANT CHANGE UP (ref 3.8–10.5)
WBC # FLD AUTO: 9.96 K/UL — SIGNIFICANT CHANGE UP (ref 3.8–10.5)

## 2023-09-08 PROCEDURE — 76815 OB US LIMITED FETUS(S): CPT | Mod: 26

## 2023-09-08 PROCEDURE — 99285 EMERGENCY DEPT VISIT HI MDM: CPT

## 2023-09-08 PROCEDURE — 74181 MRI ABDOMEN W/O CONTRAST: CPT | Mod: 26,MA

## 2023-09-08 PROCEDURE — 76817 TRANSVAGINAL US OBSTETRIC: CPT | Mod: 26

## 2023-09-08 PROCEDURE — 72195 MRI PELVIS W/O DYE: CPT | Mod: 26,MA

## 2023-09-08 RX ORDER — CEPHALEXIN 500 MG
1 CAPSULE ORAL
Qty: 28 | Refills: 0
Start: 2023-09-08 | End: 2023-09-14

## 2023-09-08 NOTE — ED PROVIDER NOTE - CLINICAL SUMMARY MEDICAL DECISION MAKING FREE TEXT BOX
37yF w/no stated pmhx presenting to the ED with lower abdominal pain that began this morning. Pain had initially been intermittent, now constant, localizes pain to RLQ/suprapubic region, at times with radiation to the left side. Associated she reports urinary frequency, states she has noticed vaginal discharge with slight odor the past 4 days (reports hx of similar discharge which resolved on its own). On exam pt is well appearing, afebrile, +RLQ/suprapubic ttp, gu exam with thin white vaginal discharge, no CMT or adnexal tenderness. Concern for appendicitis, uti, less likely ovarian cyst, no CMT to suggest PID. Plan: cbc/cmp, ua/ucx, CT abd/pelvis, TVUS, ucg. Pt offered and declined pain medication 37yF w/no stated pmhx presenting to the ED with lower abdominal pain that began this morning. Pain had initially been intermittent, now constant, localizes pain to RLQ/suprapubic region, at times with radiation to the left side. Associated she reports urinary frequency, states she has noticed vaginal discharge with slight odor the past 4 days (reports hx of similar discharge which resolved on its own). On exam pt is well appearing, afebrile, +RLQ/suprapubic ttp, gu exam with thin white vaginal discharge, no CMT or adnexal tenderness. Concern for appendicitis, uti, less likely ovarian cyst, no CMT to suggest PID. Plan: cbc/cmp, ua/ucx, CT abd/pelvis, TVUS, ucg. Pt offered and declined pain medication    UCG positive, CT cancelled, US showing 8 week IUP without cardiac activity, pt continues to have RLQ pain (declined pain medication), sent to CDU for MRI to r/o appendicitis

## 2023-09-08 NOTE — ED PROVIDER NOTE - ATTENDING APP SHARED VISIT CONTRIBUTION OF CARE
Brief HPI:  37-year-old female no past medical history presents to ED with lower abdominal pain.  Patient states that pain is in the right lower quadrant and suprapubic region.  Reports urinary frequency and noticed scant vaginal discharge for the past 4 days.  Denies fever, chills, nausea, vomiting, diarrhea.    Vitals:   Reviewed    Exam:    GEN:  Non-toxic appearing, non-distressed, speaking full sentences, non-diaphoretic, AAOx3  HEENT:  NCAT, neck supple, EOMI, PERRLA, sclera anicteric, no conjunctival pallor or injection, no stridor, normal voice, no tonsillar exudate, uvula midline  CV:  regular rhythm and rate, s1/s2 audible, no murmurs, rubs or gallops, peripheral pulses 2+ and symmetric  PULM:  non-labored respirations, lungs clear to auscultation bilaterally, no wheezes, crackles or rales  ABD:  non distended, Tender to palpation right lower quadrant, no rebound, no guarding, negative Jung's sign, bowel sounds normal, no cvat  MSK:  no gross deformity, non-tender extremities and joints, range of motion grossly normal appearing, no extremity edema, extremities warm and well perfused   NEURO:  AAOx3, CN II-XII intact, motor 5/5 in upper and lower extremities bilaterally, sensation grossly intact in extremities and trunk  SKIN:  warm, dry, no rash or vesicles     A/P: 37-year-old female no past medical history presents to ED with lower abdominal pain.  Tenderness in right lower quadrant abdomen.  Possible ovarian cyst although no concern for torsion at this time.  Appendicitis also considered.  Will send labs, transvaginal ultrasound, supportive care.  Disposition pending.

## 2023-09-08 NOTE — ED PROVIDER NOTE - PATIENT PORTAL LINK FT
You can access the FollowMyHealth Patient Portal offered by [facility] by registering at the following website: [email or web address]. By joining [employer]’s FollowMyHealth portal, you will also be able to view your health information using other applications (apps) compatible with our system.

## 2023-09-08 NOTE — ED ADULT TRIAGE NOTE - RESPIRATORY RATE (BREATHS/MIN)
16
Quality 111:Pneumonia Vaccination Status For Older Adults: Pneumococcal Vaccination Previously Received
Quality 110: Preventive Care And Screening: Influenza Immunization: Influenza Immunization previously received during influenza season
Detail Level: Detailed

## 2023-09-08 NOTE — ED PROVIDER NOTE - NSFOLLOWUPINSTRUCTIONS_ED_ALL_ED_FT
Follow up with your OBGYN within 1 week, you can also call the clinic at [phone] to make an appointment, show copies of your results  Take Keflex 500mg (1 tablet) 4 times a day for 7 days  Take Tylenol 650mg every 6 hours as needed for pain  Return to the ER with any worsening or concerning symptoms, increased pain, heavy vaginal bleeding, vomiting, fever, weakness or any other concerns.

## 2023-09-08 NOTE — ED PROVIDER NOTE - PROGRESS NOTE DETAILS
MAYLIN Perez: US showing 8 week IUP without cardiac activity, pt reassessed, continues to have RLQ tenderness. Pt presented to attending, will have pt sent to CDU for MRI abd/pelvis to r/o appendicitis MAYLIN Perez: Pt accepted to CDU by MAYLIN Marks MAYLIN Perez: US showing 8 week IUP without cardiac activity, pt reassessed, continues to have RLQ tenderness. Pt presented to attending, will have pt sent to CDU for MRI abd/pelvis to r/o appendicitis. Pt agreeable with CDU stay for MRI MAYLIN Perez: Pt had MRI performed prior to going to CDU, MRI with normal appendix. All results discussed with pt, she will follow up with her OBGYN within 3-5 days, clinic information and referral list also provided. She will return to the ER with any worsening or concerning symptoms. Will treat for UTI given WBCs in urine during pregnancy (also does report frequency) MAYLIN Perez: Pt had MRI performed prior to going to CDU, MRI with normal appendix. All results discussed with pt, she will follow up with her OBGYN within 3-5 days, clinic information and referral list also provided. She will return to the ER with any worsening or concerning symptoms. Will treat for UTI given WBCs in urine during pregnancy (also does report frequency)  Jacob  ID [id number]

## 2023-09-08 NOTE — ED PROVIDER NOTE - OBJECTIVE STATEMENT
37yF w/no stated pmhx presenting to the ED with lower abdominal pain that began this morning. Pain had initially been intermittent, now constant. 37yF w/no stated pmhx presenting to the ED with lower abdominal pain that began this morning. Pain had initially been intermittent, now constant, localizes pain to RLQ/suprapubic region, at times with radiation to the left side. Associated she reports urinary frequency, states she has noticed vaginal discharge with slight odor the past 4 days (reports hx of similar discharge which resolved on its own). Pt denies fever/chills, nausea, vomiting, diarrhea, hematuria, back pain, cp, sob, palpitations, weakness, headache, dizziness, recent travel or illness or any other concerns.

## 2023-09-09 LAB
C TRACH RRNA SPEC QL NAA+PROBE: SIGNIFICANT CHANGE UP
N GONORRHOEA RRNA SPEC QL NAA+PROBE: SIGNIFICANT CHANGE UP
SPECIMEN SOURCE: SIGNIFICANT CHANGE UP

## 2023-09-10 ENCOUNTER — EMERGENCY (EMERGENCY)
Facility: HOSPITAL | Age: 38
LOS: 1 days | Discharge: HOME | End: 2023-09-10
Attending: STUDENT IN AN ORGANIZED HEALTH CARE EDUCATION/TRAINING PROGRAM | Admitting: STUDENT IN AN ORGANIZED HEALTH CARE EDUCATION/TRAINING PROGRAM
Payer: MEDICAID

## 2023-09-10 VITALS
HEART RATE: 76 BPM | OXYGEN SATURATION: 100 % | TEMPERATURE: 99 F | SYSTOLIC BLOOD PRESSURE: 136 MMHG | RESPIRATION RATE: 16 BRPM | DIASTOLIC BLOOD PRESSURE: 80 MMHG

## 2023-09-10 LAB
ALBUMIN SERPL ELPH-MCNC: 4.4 G/DL — SIGNIFICANT CHANGE UP (ref 3.3–5)
ALP SERPL-CCNC: 59 U/L — SIGNIFICANT CHANGE UP (ref 40–120)
ALT FLD-CCNC: 44 U/L — HIGH (ref 4–33)
ANION GAP SERPL CALC-SCNC: 12 MMOL/L — SIGNIFICANT CHANGE UP (ref 7–14)
AST SERPL-CCNC: 31 U/L — SIGNIFICANT CHANGE UP (ref 4–32)
BASOPHILS # BLD AUTO: 0.06 K/UL — SIGNIFICANT CHANGE UP (ref 0–0.2)
BASOPHILS NFR BLD AUTO: 0.7 % — SIGNIFICANT CHANGE UP (ref 0–2)
BILIRUB SERPL-MCNC: 0.6 MG/DL — SIGNIFICANT CHANGE UP (ref 0.2–1.2)
BLD GP AB SCN SERPL QL: NEGATIVE — SIGNIFICANT CHANGE UP
BUN SERPL-MCNC: 8 MG/DL — SIGNIFICANT CHANGE UP (ref 7–23)
CALCIUM SERPL-MCNC: 9.2 MG/DL — SIGNIFICANT CHANGE UP (ref 8.4–10.5)
CHLORIDE SERPL-SCNC: 102 MMOL/L — SIGNIFICANT CHANGE UP (ref 98–107)
CO2 SERPL-SCNC: 22 MMOL/L — SIGNIFICANT CHANGE UP (ref 22–31)
CREAT SERPL-MCNC: 0.54 MG/DL — SIGNIFICANT CHANGE UP (ref 0.5–1.3)
CULTURE RESULTS: SIGNIFICANT CHANGE UP
EGFR: 122 ML/MIN/1.73M2 — SIGNIFICANT CHANGE UP
EOSINOPHIL # BLD AUTO: 0.13 K/UL — SIGNIFICANT CHANGE UP (ref 0–0.5)
EOSINOPHIL NFR BLD AUTO: 1.5 % — SIGNIFICANT CHANGE UP (ref 0–6)
GLUCOSE SERPL-MCNC: 132 MG/DL — HIGH (ref 70–99)
HCG SERPL-ACNC: SIGNIFICANT CHANGE UP MIU/ML
HCT VFR BLD CALC: 35.3 % — SIGNIFICANT CHANGE UP (ref 34.5–45)
HGB BLD-MCNC: 11.7 G/DL — SIGNIFICANT CHANGE UP (ref 11.5–15.5)
IANC: 5.14 K/UL — SIGNIFICANT CHANGE UP (ref 1.8–7.4)
IMM GRANULOCYTES NFR BLD AUTO: 0.4 % — SIGNIFICANT CHANGE UP (ref 0–0.9)
LYMPHOCYTES # BLD AUTO: 2.6 K/UL — SIGNIFICANT CHANGE UP (ref 1–3.3)
LYMPHOCYTES # BLD AUTO: 30.4 % — SIGNIFICANT CHANGE UP (ref 13–44)
MCHC RBC-ENTMCNC: 27.2 PG — SIGNIFICANT CHANGE UP (ref 27–34)
MCHC RBC-ENTMCNC: 33.1 GM/DL — SIGNIFICANT CHANGE UP (ref 32–36)
MCV RBC AUTO: 82.1 FL — SIGNIFICANT CHANGE UP (ref 80–100)
MONOCYTES # BLD AUTO: 0.58 K/UL — SIGNIFICANT CHANGE UP (ref 0–0.9)
MONOCYTES NFR BLD AUTO: 6.8 % — SIGNIFICANT CHANGE UP (ref 2–14)
NEUTROPHILS # BLD AUTO: 5.14 K/UL — SIGNIFICANT CHANGE UP (ref 1.8–7.4)
NEUTROPHILS NFR BLD AUTO: 60.2 % — SIGNIFICANT CHANGE UP (ref 43–77)
NRBC # BLD: 0 /100 WBCS — SIGNIFICANT CHANGE UP (ref 0–0)
NRBC # FLD: 0 K/UL — SIGNIFICANT CHANGE UP (ref 0–0)
PLATELET # BLD AUTO: 401 K/UL — HIGH (ref 150–400)
POTASSIUM SERPL-MCNC: 3.8 MMOL/L — SIGNIFICANT CHANGE UP (ref 3.5–5.3)
POTASSIUM SERPL-SCNC: 3.8 MMOL/L — SIGNIFICANT CHANGE UP (ref 3.5–5.3)
PROT SERPL-MCNC: 8.7 G/DL — HIGH (ref 6–8.3)
RBC # BLD: 4.3 M/UL — SIGNIFICANT CHANGE UP (ref 3.8–5.2)
RBC # FLD: 13.5 % — SIGNIFICANT CHANGE UP (ref 10.3–14.5)
RH IG SCN BLD-IMP: POSITIVE — SIGNIFICANT CHANGE UP
SODIUM SERPL-SCNC: 136 MMOL/L — SIGNIFICANT CHANGE UP (ref 135–145)
SPECIMEN SOURCE: SIGNIFICANT CHANGE UP
WBC # BLD: 8.54 K/UL — SIGNIFICANT CHANGE UP (ref 3.8–10.5)
WBC # FLD AUTO: 8.54 K/UL — SIGNIFICANT CHANGE UP (ref 3.8–10.5)

## 2023-09-10 PROCEDURE — 99285 EMERGENCY DEPT VISIT HI MDM: CPT

## 2023-09-10 RX ORDER — ACETAMINOPHEN 500 MG
650 TABLET ORAL ONCE
Refills: 0 | Status: COMPLETED | OUTPATIENT
Start: 2023-09-10 | End: 2023-09-10

## 2023-09-10 RX ADMIN — Medication 650 MILLIGRAM(S): at 08:01

## 2023-09-10 NOTE — ED PROVIDER NOTE - NSFOLLOWUPCLINICS_GEN_ALL_ED_FT
[facility]  OB/GYN & Surg  [id number] [place] [address]  Phone: [phone]  Fax:

## 2023-09-10 NOTE — ED PROVIDER NOTE - OBJECTIVE STATEMENT
38 y/o female with pmh of HTN and DM presents for bleeding. She was previously seen 2 days ago at [facility] for the same complaint and was found to have a fetal demise. She was instructed to follow up with OB outpatient for further care of pregnancy. Today she is presenting for bleeding that started at 4am. She has only been wearing one pad. She states she tried to make an appointment with 38 y/o female with pmh of HTN and DM presents for bleeding. She was previously seen 2 days ago for the same complaint and was found to have a fetal demise. She was instructed to follow up with OB outpatient for further management. Today she is presenting because of bleeding that started at 4am. She has only been wearing one pad. She is currently being treated for a UTI per previous visit. She states she tried to make an appointment with the OB clinic but was not successful. She denies any chest pain, sob, fevers.

## 2023-09-10 NOTE — ED PROVIDER NOTE - NSFOLLOWUPINSTRUCTIONS_ED_ALL_ED_FT
You have been evaluated in the Emergency Department today for vaginal bleed. Your evaluation, including  OB consult, Pelvic exam and labs, suggests that your symptoms are due to your known fetal demise.    Please follow up with OB clinic for further management. OB will make arrangements with clinic so that you will be seen this week.  The clinic number is [phone].  Please call if you do not receive a call from the clinic within 48 hours    Return to the Emergency Department if you experience bleeding that requires more then 2 pads, if you develop fevers, or if you start to feel light headed or dizzy.

## 2023-09-10 NOTE — ED PROVIDER NOTE - PATIENT PORTAL LINK FT
You can access the FollowMyHealth Patient Portal offered by [facility] by registering at the following website: [email or web address]. By joining [employer]’s FollowMyHealth portal, you will also be able to view your health information using other applications (apps) compatible with our system.

## 2023-09-10 NOTE — ED PROVIDER NOTE - PROGRESS NOTE DETAILS
Trish Suarez PGY1-consulted OB. Spoke to Dr. Constantino about the case, she requested a repeat U/S and she will come see the patient. I ordered the U/S. Trish Suarez PGy1: : performed a pelvic exam. Chaperoned by Dr. Mccullough. white discharge was evident. No blood seen, no obvious lacerations, or signs of trauma. Bimanual exam performed, could not appreciate if the cervix was open or close. Trish Suarez PGY1: Spoke with Dr. Constantino after she saw the patient. She stated she discussed the case with the attending and they no longer need the ultrasound. They want her to follow up outpatient. They will make sure on their end she is able to get an appointment within this week. They also stated return precautions: bleeding more then 2 pads, develop fever or feels lightheaded. Trish Suarez PGY1: used a  to discuss discharge instructions. Instructed patient to return if she feels dizzy, vaginal bleeding with more then 2 pad uses, fevers, dizziness, lightheadedness, or worsening symptoms. Circled the clinic number on the sheet and told her to call in 48 hours if they do not call her for an appointment. Patient stated she understood everything and had no further questions.   [id number] lalo coronado was the .

## 2023-09-10 NOTE — ED PROVIDER NOTE - TOBACCO USE
Spoke to pharmacy to clarify RX.  Per Dr. Smith 5 ml QID prn for sore throat   Unknown if ever smoked

## 2023-09-10 NOTE — CONSULT NOTE ADULT - ASSESSMENT
A/P: 37 yof, , LMP 23, with TVUS from  showing missed , presenting with abdomina     with vaginal spotting/abdominal pain, found to have positive b-HCG level of **** and no evidence of intrauterine pregnancy consistent with pregnancy of unknown location. Discussed findings with patient. Discussed that this pregnancy can be one of three things: viable IUP, failed pregnancy/miscarriage, or ectopic pregnancy. Discussed importance of close follow-up with repeat HCG level in 48 hours. Discussed ectopic precautions.    - f/u HCG level in 48 hours  - ectopic precautions reviewed with patient  - patient to be added to GYN team Beta list       Brian Constantino, PGY-2  d/w  A/P: 37 yof, , LMP 23, with TVUS from  showing missed , presenting with abdominal pain. No evidence of hemodynamic instability and no hjeavy bleeding at this time. Patient desiring expectant management at this time.       - PO pain meds (acetaminophen and ibuprofen) for pain control  - patient to f/u with Gyn clinic within the following week  - strict return precautions discussed with patient including heavy vaginal bleeding (soaking through 2 pads/hr, lightheadedness/dizziness, nausea or vomiting, or onset of severe pain)  - no indications for operative procedure at this time      Brian Constantino, PGY-2  d/w Dr. Hernandez

## 2023-09-10 NOTE — ED PROVIDER NOTE - PHYSICAL EXAMINATION
Physical Exam:  Gen:  discomfort, awake, alert, non-toxic appearing  Head: NCAT  HEENT: Normal conjunctiva, trachea midline  Lung: CTAB, no respiratory distress speaking in full sentences  CV: RRR, no murmurs, rubs or gallops  Abd: Soft, non-tender, non-distended,   MSK: No visible deformities, moves all four extremities  Neuro: No focal motor deficits  Skin: Warm, well perfused, no visible rashes, no leg swelling  Psych: appropriate affect and mood Physical Exam:  Gen:  discomfort, awake, alert, non-toxic appearing  Head: NCAT  HEENT: Normal conjunctiva, trachea midline  Lung: CTAB, no respiratory distress speaking in full sentences  CV: RRR, no murmurs, rubs or gallops  Abd: Soft, non-tender, non-distended,   MSK: No visible deformities, moves all four extremities  Neuro: No focal motor deficits  Skin: Warm, well perfused, no visible rashes, no leg swelling  : Dr. Suarez performed a pelvic exam. Chaperoned by Dr. Pinzon. white discharge was evident. No blood seen, no obvious lacerations, or signs of trauma. Bimanual exam performed, could not appreciate if the cervix was open or close.   Psych: appropriate affect and mood Physical Exam:  Gen:  discomfort, awake, alert, non-toxic appearing  Head: NCAT  HEENT: Normal conjunctiva, trachea midline  Lung: CTAB, no respiratory distress speaking in full sentences  CV: RRR, no murmurs, rubs or gallops  Abd: Soft, non-tender, non-distended,   MSK: No visible deformities, moves all four extremities  Neuro: No focal motor deficits  Skin: Warm, well perfused, no visible rashes, no leg swelling  : Dr. Suarez performed a pelvic exam. Chaperoned by Dr. Mccullough. white discharge was evident. No blood seen, no obvious lacerations, or signs of trauma. Bimanual exam performed, could not appreciate if the cervix was open or close.   Psych: appropriate affect and mood

## 2023-09-10 NOTE — CONSULT NOTE ADULT - SUBJECTIVE AND OBJECTIVE BOX
GYN Consult Note    37y  LMP 23 who presents to the ED for a second visit in 2 days for RLQ pain and new onset bleeding at 4am today. Was seen in the ED on 23 when she was diagnosed with missed  via TVUS that showed IUP GA 8wk without fetal cardiac activity. Was instructed on that day to f/u with OBGYn; [facility] numbers provided. Patient returns today stating continued pain and mild bleeding that she started noticing today morning. Denies heavy vaginal bleeding, not soaking through pads and denies any passing of significant clots or tissues. Also denies nausea, vomiting, fevers, chills, lightheadedness or dizziness.        OB/GYN HISTORY:   Physician: none  Ob:   - G1:  FT   - G2:  FT   - G3:  SAB  Gyn: Denies fibroids, cysts, PCOS, endometriosis, or history of genital lesions   PMH: T2DM, HTN, thyroid disease, HLD  PSH: thyroidectomy ()  Meds: Losartan 50mg, Metformin 500mg qd, atorvastatin 10mg qd    Allergies:  No Known Allergies        REVIEW OF SYSTEMS  General: denies fevers, chills, tiredness  Skin/Breast: denies breast pain  Respiratory and Thorax: denies shortness of breath or cough  Cardiovascular: denies chest pain, palpitations  Gastrointestinal: abdominal pain, denies nausea/ vomiting	  Genitourinary: denies heavy vaginal bleeding  Constitutional, Cardiovascular, Respiratory, Gastrointestinal, Genitourinary, Musculoskeletal and Integumentary review of systems are normal except as noted. 	      Vital Signs Last 24 Hrs  T(C): 37.1 (10 Sep 2023 05:42), Max: 37.1 (10 Sep 2023 05:42)  T(F): 98.7 (10 Sep 2023 05:42), Max: 98.7 (10 Sep 2023 05:42)  HR: 76 (10 Sep 2023 05:42) (76 - 76)  BP: 136/80 (10 Sep 2023 05:42) (136/80 - 136/80)  BP(mean): --  RR: 16 (10 Sep 2023 05:42) (16 - 16)  SpO2: 100% (10 Sep 2023 05:42) (100% - 100%)    Parameters below as of 10 Sep 2023 05:42  Patient On (Oxygen Delivery Method): room air        PHYSICAL EXAM: Chaperoned w/ ED Tech at bedside.   Gen: NAD, alert and oriented x 3  Cardiovascular: regular   Respiratory: breathing comfortably on RA  Abd: soft, non tender, non-distended  Pelvic: 0.5cm dilated, no CMT, Uterus: 6-7wk size, non tender  Adnexa: non tender, no palpable masses  : no heavy vaginal bleeding noted in vaginal vault  Extremities: NTBL  Skin: warm and well perfused      LABS:                        11.7   8.54  )-----------( 401      ( 10 Sep 2023 08:09 )             35.3     09-10    136  |  102  |  8   ----------------------------<  132<H>  3.8   |  22  |  0.54    Ca    9.2      10 Sep 2023 08:09    TPro  8.7<H>  /  Alb  4.4  /  TBili  0.6  /  DBili  x   /  AST  31  /  ALT  44<H>  /  AlkPhos  59  09-10      Urinalysis Basic - ( 10 Sep 2023 08:09 )    Color: x / Appearance: x / SG: x / pH: x  Gluc: 132 mg/dL / Ketone: x  / Bili: x / Urobili: x   Blood: x / Protein: x / Nitrite: x   Leuk Esterase: x / RBC: x / WBC x   Sq Epi: x / Non Sq Epi: x / Bacteria: x        RADIOLOGY & ADDITIONAL STUDIES:  < from: US Pelvis Limited or Follow Up (23 @ 16:04) >  FINDINGS:  Uterus: A single intrauterine gestation is noted.    Gestational Sac Size (mean): 3.1 x 2.0 x 1.7 cm  Gestational Sac Shape : Normal. There is minimal hypoechogenicity   surrounding the gestational sac, which may represent subchorionic   hemorrhage.  [place] Rump Length: 15.2 mm  Estimated Gestational Age: 8 weeks 0 days by crown rump length.  Yolk Sac: Normal  Fetal Heart Rate: Not visualized.    Right ovary: 4.5 cm x 1.7 cm x 1.9 cm. A 1. 5 x 0.9 cm hypoechoic region   is noted within the right ovarian parenchyma, likely corpus luteum. Blood   flow identified within the right ovarian parenchyma.  Left ovary: 2.0 cm x 1.6 cm x 1.9 cm. Within normal limits. Blood flow   identified within the left ovarian parenchyma.    Fluid: No free pelvic fluid identified.    IMPRESSION:  Single intrauterine gestation, without sonographic visualization of fetal   heart motion raises suspicion for fetal demise in light of crown-rump   length exceeding threshold value.  Estimated gestational age of 8 weeks 0 day.        --- End of Report ---    < end of copied text >

## 2023-09-10 NOTE — ED PROVIDER NOTE - CLINICAL SUMMARY MEDICAL DECISION MAKING FREE TEXT BOX
Patient presenting for vaginal bleeding that started this morning at 6am. Patient was seen 2 days prior and was found to have a fetal demise.   Will consult OB to see if there is anything we can offer her here to fasten the process of management or to treat outpatient.

## 2023-09-10 NOTE — ED PROVIDER NOTE - ATTENDING CONTRIBUTION TO CARE
37-year-old female with history of hypertension diabetes hyperlipidemia presents for left-sided abdominal pain and slight vaginal bleeding.  Patient was seen here 2 days ago and was found to be pregnant had ultrasound showing 8-week fetal demise.  Patient stayed for MRI of her abdomen which did not show any acute pathology patient was told to follow-up with GYN for further management of the fetal demise however has been unable to make an appointment as well.  This morning patient started having light vaginal bleeding and persistent pain so came into the ER.  On exam patient is well-appearing pelvic exam with no active bleeding patient is currently being treated for UTI we will discuss with GYN already expectant management in ED versus as outpatient, will give Tylenol for pain

## 2023-09-10 NOTE — ED ADULT TRIAGE NOTE - CHIEF COMPLAINT QUOTE
Patient c/o LLQ pain. Was seen here two days ago for same complaint, told to follow up with PCP but came back due to worsening pain. Denies nausea/vomiting. No CP/SOB. No PMH.

## 2023-09-11 PROBLEM — I10 ESSENTIAL (PRIMARY) HYPERTENSION: Chronic | Status: ACTIVE | Noted: 2023-09-10

## 2023-09-15 ENCOUNTER — EMERGENCY (EMERGENCY)
Facility: HOSPITAL | Age: 38
LOS: 1 days | Discharge: HOME | End: 2023-09-15
Attending: EMERGENCY MEDICINE | Admitting: EMERGENCY MEDICINE
Payer: MEDICAID

## 2023-09-15 VITALS
SYSTOLIC BLOOD PRESSURE: 143 MMHG | HEART RATE: 90 BPM | OXYGEN SATURATION: 100 % | RESPIRATION RATE: 17 BRPM | DIASTOLIC BLOOD PRESSURE: 83 MMHG | TEMPERATURE: 98 F

## 2023-09-15 LAB
ALBUMIN SERPL ELPH-MCNC: 4.4 G/DL — SIGNIFICANT CHANGE UP (ref 3.3–5)
ALP SERPL-CCNC: 59 U/L — SIGNIFICANT CHANGE UP (ref 40–120)
ALT FLD-CCNC: 46 U/L — HIGH (ref 4–33)
ANION GAP SERPL CALC-SCNC: 10 MMOL/L — SIGNIFICANT CHANGE UP (ref 7–14)
APPEARANCE UR: ABNORMAL
AST SERPL-CCNC: 29 U/L — SIGNIFICANT CHANGE UP (ref 4–32)
BACTERIA # UR AUTO: NEGATIVE /HPF — SIGNIFICANT CHANGE UP
BASOPHILS # BLD AUTO: 0.07 K/UL — SIGNIFICANT CHANGE UP (ref 0–0.2)
BASOPHILS NFR BLD AUTO: 0.5 % — SIGNIFICANT CHANGE UP (ref 0–2)
BILIRUB SERPL-MCNC: 0.6 MG/DL — SIGNIFICANT CHANGE UP (ref 0.2–1.2)
BILIRUB UR-MCNC: NEGATIVE — SIGNIFICANT CHANGE UP
BLD GP AB SCN SERPL QL: NEGATIVE — SIGNIFICANT CHANGE UP
BUN SERPL-MCNC: 10 MG/DL — SIGNIFICANT CHANGE UP (ref 7–23)
CALCIUM SERPL-MCNC: 9.7 MG/DL — SIGNIFICANT CHANGE UP (ref 8.4–10.5)
CAST: 2 /LPF — SIGNIFICANT CHANGE UP (ref 0–4)
CHLORIDE SERPL-SCNC: 103 MMOL/L — SIGNIFICANT CHANGE UP (ref 98–107)
CO2 SERPL-SCNC: 23 MMOL/L — SIGNIFICANT CHANGE UP (ref 22–31)
COLOR SPEC: ABNORMAL
CREAT SERPL-MCNC: 0.57 MG/DL — SIGNIFICANT CHANGE UP (ref 0.5–1.3)
DIFF PNL FLD: ABNORMAL
EGFR: 121 ML/MIN/1.73M2 — SIGNIFICANT CHANGE UP
EOSINOPHIL # BLD AUTO: 0.15 K/UL — SIGNIFICANT CHANGE UP (ref 0–0.5)
EOSINOPHIL NFR BLD AUTO: 1.1 % — SIGNIFICANT CHANGE UP (ref 0–6)
GLUCOSE SERPL-MCNC: 133 MG/DL — HIGH (ref 70–99)
GLUCOSE UR QL: NEGATIVE MG/DL — SIGNIFICANT CHANGE UP
HCG SERPL-ACNC: 5394 MIU/ML — SIGNIFICANT CHANGE UP
HCT VFR BLD CALC: 34.3 % — LOW (ref 34.5–45)
HGB BLD-MCNC: 11.7 G/DL — SIGNIFICANT CHANGE UP (ref 11.5–15.5)
IANC: 9.27 K/UL — HIGH (ref 1.8–7.4)
IMM GRANULOCYTES NFR BLD AUTO: 0.3 % — SIGNIFICANT CHANGE UP (ref 0–0.9)
KETONES UR-MCNC: NEGATIVE MG/DL — SIGNIFICANT CHANGE UP
LEUKOCYTE ESTERASE UR-ACNC: ABNORMAL
LYMPHOCYTES # BLD AUTO: 22.9 % — SIGNIFICANT CHANGE UP (ref 13–44)
LYMPHOCYTES # BLD AUTO: 3.02 K/UL — SIGNIFICANT CHANGE UP (ref 1–3.3)
MCHC RBC-ENTMCNC: 27.7 PG — SIGNIFICANT CHANGE UP (ref 27–34)
MCHC RBC-ENTMCNC: 34.1 GM/DL — SIGNIFICANT CHANGE UP (ref 32–36)
MCV RBC AUTO: 81.1 FL — SIGNIFICANT CHANGE UP (ref 80–100)
MONOCYTES # BLD AUTO: 0.66 K/UL — SIGNIFICANT CHANGE UP (ref 0–0.9)
MONOCYTES NFR BLD AUTO: 5 % — SIGNIFICANT CHANGE UP (ref 2–14)
NEUTROPHILS # BLD AUTO: 9.27 K/UL — HIGH (ref 1.8–7.4)
NEUTROPHILS NFR BLD AUTO: 70.2 % — SIGNIFICANT CHANGE UP (ref 43–77)
NITRITE UR-MCNC: NEGATIVE — SIGNIFICANT CHANGE UP
NRBC # BLD: 0 /100 WBCS — SIGNIFICANT CHANGE UP (ref 0–0)
NRBC # FLD: 0 K/UL — SIGNIFICANT CHANGE UP (ref 0–0)
PH UR: 6 — SIGNIFICANT CHANGE UP (ref 5–8)
PLATELET # BLD AUTO: 443 K/UL — HIGH (ref 150–400)
POTASSIUM SERPL-MCNC: 4 MMOL/L — SIGNIFICANT CHANGE UP (ref 3.5–5.3)
POTASSIUM SERPL-SCNC: 4 MMOL/L — SIGNIFICANT CHANGE UP (ref 3.5–5.3)
PROT SERPL-MCNC: 7.4 G/DL — SIGNIFICANT CHANGE UP (ref 6–8.3)
PROT UR-MCNC: 30 MG/DL
RBC # BLD: 4.23 M/UL — SIGNIFICANT CHANGE UP (ref 3.8–5.2)
RBC # FLD: 13.2 % — SIGNIFICANT CHANGE UP (ref 10.3–14.5)
RBC CASTS # UR COMP ASSIST: 2357 /HPF — HIGH (ref 0–4)
RH IG SCN BLD-IMP: POSITIVE — SIGNIFICANT CHANGE UP
SODIUM SERPL-SCNC: 136 MMOL/L — SIGNIFICANT CHANGE UP (ref 135–145)
SP GR SPEC: 1.01 — SIGNIFICANT CHANGE UP (ref 1–1.03)
SQUAMOUS # UR AUTO: 3 /HPF — SIGNIFICANT CHANGE UP (ref 0–5)
UROBILINOGEN FLD QL: 0.2 MG/DL — SIGNIFICANT CHANGE UP (ref 0.2–1)
WBC # BLD: 13.21 K/UL — HIGH (ref 3.8–10.5)
WBC # FLD AUTO: 13.21 K/UL — HIGH (ref 3.8–10.5)
WBC UR QL: 4 /HPF — SIGNIFICANT CHANGE UP (ref 0–5)

## 2023-09-15 PROCEDURE — 76817 TRANSVAGINAL US OBSTETRIC: CPT | Mod: 26

## 2023-09-15 PROCEDURE — 99285 EMERGENCY DEPT VISIT HI MDM: CPT

## 2023-09-15 NOTE — CONSULT NOTE ADULT - ASSESSMENT
37 year old  LMP 23 presents to the ED with vaginal bleeding in the setting of a known MAB. On GYN evaluation patient endorses passing POC this AM at home. In the ED patient's vital signs were stable. Patient noted to have minimal bleeding on physical exam. On VE patient dilated 1-2cm. Lab work significant for a drop in bHCG from 15,396 (9/10) to 5394 (9/15). Patient's clinical picture most consistent with  in progress.    -f/u TVUS  -as patient is likely spontaneously passing her pregnancy no indication for any further GYN intervention  -as long as bleeding remains minimal, after TVUS, patient cleared for d/c from GYN perspective  -all questions answered to patient's satisfaction  -patient to f/u in [facility] ([phone] [place] [address]) next week to assure complete resolution of pregnancy    discussed w Dr Heriberto De La O PGY3     37 year old  LMP 23 presents to the ED with vaginal bleeding in the setting of a known MAB. On GYN evaluation patient endorses passing POC this AM at home. In the ED patient's vital signs were stable. Patient noted to have minimal bleeding on physical exam. On VE patient dilated 1-2cm. Lab work significant for a drop in bHCG from 15,396 (9/10) to 5394 (9/15). Patient's clinical picture most consistent with  in progress.    -f/u TVUS  -as patient is likely spontaneously passing her pregnancy no indication for any further GYN intervention--pt opts conservative care and to pass remaining tissue on own spontaneously  -as long as bleeding remains minimal, after TVUS, patient cleared for d/c from GYN perspective  -all questions answered to patient's satisfaction  -patient to f/u in [facility] ([phone] [place] [address]) next week to assure complete resolution of pregnancy    discussed w Dr Heriberto De La O PGY3

## 2023-09-15 NOTE — ED ADULT TRIAGE NOTE - CHIEF COMPLAINT QUOTE
Pt coming to ER c/o vaginal bleeding that started yesterday. Pt states she is about 9 weeks pregnant. Endorsing back pain with cramping. Noted to have clots and saturated about 3-4 pads. Hx HTN, DM Finger stick 173

## 2023-09-15 NOTE — ED ADULT NURSE NOTE - NSFALLUNIVINTERV_ED_ALL_ED
Bed/Stretcher in lowest position, wheels locked, appropriate side rails in place/Call bell, personal items and telephone in reach/Instruct patient to call for assistance before getting out of bed/chair/stretcher/Non-slip footwear applied when patient is off stretcher/[place] to call system/Physically safe environment - no spills, clutter or unnecessary equipment/Purposeful proactive rounding/Room/bathroom lighting operational, light cord in reach

## 2023-09-15 NOTE — ED ADULT NURSE NOTE - OBJECTIVE STATEMENT
break cover RN. Pt reporting to the ED for vaginal bleeding with clots and abdominal pain since last night. Pt is  1 miscarriage in the past. Pt reports saturating 10+ pads. Pt is AOX4. Pt denies chest pain or SOB. PT respirations even an unlabored. . Pt denies fever, chills, n/v/d. Pt denies 20 g iv palced in R ac.

## 2023-09-15 NOTE — ED PROVIDER NOTE - PATIENT PORTAL LINK FT
You can access the FollowMyHealth Patient Portal offered by [facility] by registering at the following website: [email or web address]. By joining [facility]’s FollowMyHealth portal, you will also be able to view your health information using other applications (apps) compatible with our system.

## 2023-09-15 NOTE — ED ADULT TRIAGE NOTE - CAS ELECT INFOMATION PROVIDED
Patient Education     Medical Screening Exam: No Emergency  You have had a medical screening exam. The results show that you don’t have a condition that needs to be treated in the emergency department.  You can safely wait until you can see your healthcare provider for evaluation or treatment. It is up to you to make an appointment for follow-up care.  Medical emergencies  If you think you have a medical emergency, please come to the emergency department. That’s what we are here for. A medical emergency might be severe pain. It might be a condition that gets worse. Or it might be problems with a pregnancy.  The emergency department is open to all who need treatment. But if you don’t think you have a serious or life-threatening problem, try these other choices.  If you have a primary care doctor:  Call your doctor before coming to the emergency department.  After office hours, someone from your doctor’s office is on-call by phone. The person on-call may be able to give you advice over the phone on how to take care of the problem  You may be able to get an appointment to see your doctor.  If you don’t have a primary care doctor:  Call the referral doctor or clinic shown below during office hours. You should be able to make an appointment to be seen.  If you aren’t sure whether you are having an emergency, you can always return to the emergency department to be looked at.   Phone advice from the emergency department  We are here 24 hours a day to give emergency care. But this hospital does not give phone advice for medical conditions. If you need advice for a condition that can’t wait to be seen by your doctor, you will need to come back to this facility in person.  Date Last Reviewed: 9/1/2016 © 2000-2018 [employer]. [address] [place] [id number]. All rights reserved. This information is not intended as a substitute for professional medical care. Always follow your healthcare  professional's instructions.         Discussed diet and nutrition for optimal growth and development.  Discussed recommended daily 3-4 servings of milk, yogurt, cheese or fortified OJ for adequate calcium intake/bone health.  Recommend daily multivitamin if not getting 4-6 servings of fruits and veggies daily.  Recommend aerobic exercise at least 30 mins. 5x/wk.  Avoid smoking, drugs, and alcohol.  Avoid getting into a car with anybody who drinks alcohol and drives.  Shower after practices to avoid getting infectious skin diseases which can occur with certain contact sports.  Advise abstinence however, if sexually active, use condoms to prevent sexually transmitted diseases.   Instructed on how to do self breast exam.   Wear Protective gear, mouth guard, and sunscreen.  Stretch out (warming up and cooling down exercises) prior to and after exercise to prevent injuries.  Keep well hydrated with non-caffeinated fluids during exercise.  Avoid excess watching TV or video games.  Minimize sugary drinks/sodas, candy, and snacks. Avoid energy drinks.   given by MD/DC instructions

## 2023-09-15 NOTE — ED PROVIDER NOTE - OBJECTIVE STATEMENT
ID number [id number].  37-year-old female with diabetes and hypertension.  Patient is a  with history of 1 miscarriage.  Patient presents the ED with crampy abdominal pain and mild vaginal spotting started yesterday.  This morning bleeding and pain became worse.  Pain is described as crampy pain and bleeding became heavy.  Patient noticed some clots and tissue without was flushed earlier today.  Patient denies any back pain or fevers chills.  Patient denies any urinary complaints.

## 2023-09-15 NOTE — CONSULT NOTE ADULT - SUBJECTIVE AND OBJECTIVE BOX
ANA MENA  37y  Female [id number]    HPI: 37 year old  LMP 23 presents to the ED with vaginal bleeding in the setting of a known MAB. Patient initially presented to the ED on 23 c/o abdominal pain at which point she was diagnosed with a MAB. Patient represented to the ED on 9/10 with vaginal bleeding. At this time patient was evaluated by GYN and noted to have minimal bleeding. Patient was counseled on options for MAB (medical vs surgical vs expectant)--patient elected for expectant management at that time. On GYN evaluation today, patient states that she started spotting last night and woke up to significantly heavier bleeding this AM. Patient states that she changed her pad three times over the course of the morning and each time she changed her pad it was completely saturated and bled through. Patient endorses passing what appeared to be fetal tissue this AM. On GYN evaluation patient denies lightheadedness, dizziness, chest pain, shortness of breath, fevers, chills, nausea, vomiting. Patient endorses tolerating PO and having normal bowel movements.      OB/GYN HISTORY:   Physician: none  Ob:   - G1: 2017 FT   - G2:  FT   - G3:  SAB  Gyn: Denies fibroids, cysts, PCOS, endometriosis, or history of genital lesions   PMH: T2DM, HTN, thyroid disease, HLD  PSH: thyroidectomy ()  Meds: Losartan 50mg, Metformin 500mg qd, atorvastatin 10mg qd  All: NKDA    Vital Signs Last 24 Hrs  T(C): 36.7 (15 Sep 2023 10:57), Max: 36.7 (15 Sep 2023 10:57)  T(F): 98.1 (15 Sep 2023 10:57), Max: 98.1 (15 Sep 2023 10:57)  HR: 90 (15 Sep 2023 10:57) (90 - 90)  BP: 143/83 (15 Sep 2023 10:57) (143/83 - 143/83)  BP(mean): --  RR: 17 (15 Sep 2023 10:57) (17 - 17)  SpO2: 100% (15 Sep 2023 10:57) (100% - 100%)    Parameters below as of 15 Sep 2023 10:57  Patient On (Oxygen Delivery Method): room air        Physical Exam:   General: sitting comfortably in bed, NAD   CV: RR S1S2 no m/r/g  Lungs: CTA b/l, good air flow b/l   Abd: Soft, non-tender, non-distended.  Bowel sounds present.    :  Minimal bleeding on pad. External labia wnl.  Bimanual exam with no cervical motion tenderness, uterus wnl, adnexa non palpable b/l.  Cervix 1-2cm dilated  Speculum Exam: No active bleeding from os.  5 cc of blood in vaginal vault    Ext: non-tender b/l, no edema     LABS:                              11.7   13.21 )-----------( 443      ( 15 Sep 2023 12:18 )             34.3     09-15    136  |  103  |  10  ----------------------------<  133<H>  4.0   |  23  |  0.57    Ca    9.7      15 Sep 2023 12:18    TPro  7.4  /  Alb  4.4  /  TBili  0.6  /  DBili  x   /  AST  29  /  ALT  46<H>  /  AlkPhos  59  09-15    I&O's Detail      Urinalysis Basic - ( 15 Sep 2023 12:18 )    Color: x / Appearance: x / SG: x / pH: x  Gluc: 133 mg/dL / Ketone: x  / Bili: x / Urobili: x   Blood: x / Protein: x / Nitrite: x   Leuk Esterase: x / RBC: x / WBC x   Sq Epi: x / Non Sq Epi: x / Bacteria: x        RADIOLOGY & ADDITIONAL STUDIES:

## 2023-09-15 NOTE — ED ADULT NURSE NOTE - DOES PATIENT HAVE ADVANCE DIRECTIVE
Discussed poc with pt, pt verbalized understanding    Purposeful rounding every 2hours    VS wnl  Cardiac monitoring in use, pt is NSR, tele monitor # [id number]  Blood glucose monitoring   Fall precautions in place, remains injury free  Pt denies c/o Pain and nausea     IVFs  Accurate I&Os    Bed locked at lowest position  Call light within reach    Chart check complete  Will cont with POC       Ordered specialty bed for patient, awaiting woc rn to provide innerdry for pt's folds    Placed barrier cream everywhere and heel boots for now    Called annelise cordoba to give update    Just spoke with lotus at [facility] to give update as well   No

## 2023-09-15 NOTE — ED PROVIDER NOTE - PHYSICAL EXAMINATION
Pelvic exam chaperoned ED tech Karen Velasquez.  Speculum exam shows significant blood clot in vaginal vault that was evacuated.  There is brisk vaginal bleeding.  There is no adnexal tenderness noted.  Cervical os is open.

## 2023-09-15 NOTE — ED PROVIDER NOTE - NSFOLLOWUPINSTRUCTIONS_ED_ALL_ED_FT
A copy of your blood work and ultrasound report are included in this discharge paperwork.  It appears that you had a miscarriage.  Please return to the ED if you have very heavy vaginal bleeding, more than 1 pad an hour.  Please follow-up with your regular OB/GYN.

## 2023-09-15 NOTE — ED PROVIDER NOTE - CLINICAL SUMMARY MEDICAL DECISION MAKING FREE TEXT BOX
37-year-old female approximately 9 weeks gestation with vaginal bleeding and lower abdominal pain.  Patient was noted to have fetal demise on ultrasound a week ago.  We will check labs now including beta hCG and transvaginal ultrasound to assess for retained products.

## 2023-09-28 ENCOUNTER — OUTPATIENT (OUTPATIENT)
Dept: OUTPATIENT SERVICES | Facility: HOSPITAL | Age: 38
LOS: 1 days | End: 2023-09-28

## 2023-09-28 ENCOUNTER — APPOINTMENT (OUTPATIENT)
Dept: OBGYN | Facility: HOSPITAL | Age: 38
End: 2023-09-28
Payer: MEDICAID

## 2023-09-28 ENCOUNTER — RESULT REVIEW (OUTPATIENT)
Age: 38
End: 2023-09-28

## 2023-09-28 VITALS
DIASTOLIC BLOOD PRESSURE: 73 MMHG | TEMPERATURE: 98.2 F | HEIGHT: 61 IN | SYSTOLIC BLOOD PRESSURE: 118 MMHG | WEIGHT: 148 LBS | BODY MASS INDEX: 27.94 KG/M2 | HEART RATE: 74 BPM

## 2023-09-28 DIAGNOSIS — O36.80X0 PREGNANCY WITH INCONCLUSIVE FETAL VIABILITY, NOT APPLICABLE OR UNSPECIFIED: ICD-10-CM

## 2023-09-28 DIAGNOSIS — Z78.9 OTHER SPECIFIED HEALTH STATUS: ICD-10-CM

## 2023-09-28 DIAGNOSIS — Z86.79 PERSONAL HISTORY OF OTHER DISEASES OF THE CIRCULATORY SYSTEM: ICD-10-CM

## 2023-09-28 DIAGNOSIS — Z86.39 PERSONAL HISTORY OF OTHER ENDOCRINE, NUTRITIONAL AND METABOLIC DISEASE: ICD-10-CM

## 2023-09-28 DIAGNOSIS — Z00.00 ENCOUNTER FOR GENERAL ADULT MEDICAL EXAMINATION W/OUT ABNORMAL FINDINGS: ICD-10-CM

## 2023-09-28 LAB — HCG SERPL-ACNC: 13.4 MIU/ML — SIGNIFICANT CHANGE UP

## 2023-09-28 PROCEDURE — 99213 OFFICE O/P EST LOW 20 MIN: CPT | Mod: GC

## 2023-09-28 RX ORDER — METFORMIN HYDROCHLORIDE 500 MG/1
500 TABLET, COATED ORAL
Refills: 0 | Status: ACTIVE | COMMUNITY

## 2023-09-28 RX ORDER — LOSARTAN POTASSIUM 50 MG/1
50 TABLET, FILM COATED ORAL
Refills: 0 | Status: ACTIVE | COMMUNITY

## 2023-09-28 RX ORDER — ATORVASTATIN CALCIUM 10 MG/1
10 TABLET, FILM COATED ORAL
Refills: 0 | Status: ACTIVE | COMMUNITY

## 2023-09-29 DIAGNOSIS — O36.80X0 PREGNANCY WITH INCONCLUSIVE FETAL VIABILITY, NOT APPLICABLE OR UNSPECIFIED: ICD-10-CM

## 2023-10-05 ENCOUNTER — RESULT REVIEW (OUTPATIENT)
Age: 38
End: 2023-10-05

## 2023-10-05 ENCOUNTER — APPOINTMENT (OUTPATIENT)
Dept: OBGYN | Facility: HOSPITAL | Age: 38
End: 2023-10-05
Payer: MEDICAID

## 2023-10-05 ENCOUNTER — OUTPATIENT (OUTPATIENT)
Dept: OUTPATIENT SERVICES | Facility: HOSPITAL | Age: 38
LOS: 1 days | End: 2023-10-05

## 2023-10-05 VITALS
BODY MASS INDEX: 28.13 KG/M2 | HEIGHT: 61 IN | HEART RATE: 79 BPM | WEIGHT: 149 LBS | DIASTOLIC BLOOD PRESSURE: 89 MMHG | SYSTOLIC BLOOD PRESSURE: 137 MMHG | TEMPERATURE: 97.8 F

## 2023-10-05 DIAGNOSIS — L29.2 PRURITUS VULVAE: ICD-10-CM

## 2023-10-05 DIAGNOSIS — O03.9 COMPLETE OR UNSPECIFIED SPONTANEOUS ABORTION W/OUT COMPLICATION: ICD-10-CM

## 2023-10-05 DIAGNOSIS — N96 RECURRENT PREGNANCY LOSS: ICD-10-CM

## 2023-10-05 LAB — HCG SERPL-ACNC: 2.9 MIU/ML — SIGNIFICANT CHANGE UP

## 2023-10-05 PROCEDURE — 99213 OFFICE O/P EST LOW 20 MIN: CPT | Mod: GE

## 2023-10-06 DIAGNOSIS — N96 RECURRENT PREGNANCY LOSS: ICD-10-CM

## 2023-10-06 DIAGNOSIS — L29.2 PRURITUS VULVAE: ICD-10-CM

## 2023-10-06 DIAGNOSIS — O03.9 COMPLETE OR UNSPECIFIED SPONTANEOUS ABORTION WITHOUT COMPLICATION: ICD-10-CM

## 2023-10-06 LAB
CANDIDA AB TITR SER: DETECTED
G VAGINALIS DNA SPEC QL NAA+PROBE: DETECTED
T VAGINALIS SPEC QL WET PREP: SIGNIFICANT CHANGE UP

## 2024-04-22 NOTE — ED PROVIDER NOTE - CARE PLAN
I called and spoke to Precious (ok per HIPAA) and she verbalized understanding and had no questions at this time.    1 Principal Discharge DX:	Headache

## 2024-12-17 ENCOUNTER — NON-APPOINTMENT (OUTPATIENT)
Age: 39
End: 2024-12-17

## 2024-12-23 NOTE — ED ADULT TRIAGE NOTE - BEFAST ARM SIDE DRIFT
Chief Complaint  Hip Pain (Post-op left DAYAMI )      History of Present Illness:  Petrona Guzman is a pleasant 45 y.o. female who is 3 months post left DAYAMI. She has finished PT. She is having some mild anterior hip/joint pain.       Medical History:  Patient's medications, allergies, past medical, surgical, social and family histories were reviewed and updated as appropriate.    Pain Assessment  Location of Pain: Hip  Location Modifiers: Left  Severity of Pain: 1  Quality of Pain: Dull  Duration of Pain: A few hours  Frequency of Pain: Intermittent  Aggravating Factors: Other (Comment) (PROLONGED WALKING; IN AM; WEATHER CHANGES)  Limiting Behavior: Some  Relieving Factors: Rest  Result of Injury: No  Work-Related Injury: No  Are there other pain locations you wish to document?: No  ROS: Review of systems reviewed from Patient History Form completed today and available in the patient's chart under the Media tab.      Pertinent items are noted in HPI  Review of systems reviewed from Patient History Form completed today and available in the patient's chart under the Media tab.       Vital Signs:  Resp 12   Ht 1.626 m (5' 4\")   Wt 101.2 kg (223 lb)   BMI 38.28 kg/m²         Neuro: Alert & oriented x 3,  normal,  no focal deficits noted. Normal affect.  Eyes: sclera clear  Ears: Normal external ear  Mouth:  No perioral lesions  Pulm: Respirations unlabored and regular  Pulse: Extremities well perfused. 2+ peripheral pulses.  Skin: Warm. No ulcerations.      Constitutional: The physical examination finds the patient to be well-developed and well-nourished.  The patient is alert and oriented x3 and was cooperative throughout the visit.    Hip Examination: bilateral    Skin/Inspection: No skin lesions, cellulitis, or extreme edema in the lower extremities.     Standing/Walking:   and abnormal:  antalgic gait, negative Trendelenburg sign.      Supine/Side Lying Exam: Non tender around the major bony 
No

## 2025-05-11 ENCOUNTER — NON-APPOINTMENT (OUTPATIENT)
Age: 40
End: 2025-05-11

## 2025-07-01 ENCOUNTER — APPOINTMENT (OUTPATIENT)
Dept: ORTHOPEDIC SURGERY | Facility: CLINIC | Age: 40
End: 2025-07-01
Payer: MEDICAID

## 2025-07-01 PROBLEM — S80.02XA CONTUSION OF LEFT KNEE, INITIAL ENCOUNTER: Status: ACTIVE | Noted: 2025-07-01

## 2025-07-01 PROCEDURE — 99204 OFFICE O/P NEW MOD 45 MIN: CPT

## 2025-09-11 ENCOUNTER — NON-APPOINTMENT (OUTPATIENT)
Age: 40
End: 2025-09-11